# Patient Record
Sex: MALE | Race: WHITE | NOT HISPANIC OR LATINO | Employment: FULL TIME | URBAN - METROPOLITAN AREA
[De-identification: names, ages, dates, MRNs, and addresses within clinical notes are randomized per-mention and may not be internally consistent; named-entity substitution may affect disease eponyms.]

---

## 2017-06-26 ENCOUNTER — HOSPITAL ENCOUNTER (EMERGENCY)
Facility: HOSPITAL | Age: 42
Discharge: HOME | End: 2017-06-26
Attending: EMERGENCY MEDICINE
Payer: MEDICAID

## 2017-06-26 VITALS
OXYGEN SATURATION: 97 % | RESPIRATION RATE: 18 BRPM | HEIGHT: 73 IN | DIASTOLIC BLOOD PRESSURE: 79 MMHG | BODY MASS INDEX: 32.07 KG/M2 | WEIGHT: 242 LBS | SYSTOLIC BLOOD PRESSURE: 137 MMHG | TEMPERATURE: 98 F | HEART RATE: 78 BPM

## 2017-06-26 DIAGNOSIS — S61.219A FINGER LACERATION, INITIAL ENCOUNTER: Primary | ICD-10-CM

## 2017-06-26 PROCEDURE — 63600175 PHARM REV CODE 636 W HCPCS: Performed by: PHYSICIAN ASSISTANT

## 2017-06-26 PROCEDURE — 25000003 PHARM REV CODE 250: Performed by: EMERGENCY MEDICINE

## 2017-06-26 PROCEDURE — 99283 EMERGENCY DEPT VISIT LOW MDM: CPT | Mod: 25

## 2017-06-26 PROCEDURE — 12042 INTMD RPR N-HF/GENIT2.6-7.5: CPT | Mod: F7

## 2017-06-26 PROCEDURE — 25000003 PHARM REV CODE 250: Performed by: PHYSICIAN ASSISTANT

## 2017-06-26 PROCEDURE — 90471 IMMUNIZATION ADMIN: CPT | Performed by: PHYSICIAN ASSISTANT

## 2017-06-26 PROCEDURE — 90715 TDAP VACCINE 7 YRS/> IM: CPT | Performed by: PHYSICIAN ASSISTANT

## 2017-06-26 RX ORDER — HYDROCODONE BITARTRATE AND ACETAMINOPHEN 5; 325 MG/1; MG/1
1 TABLET ORAL 4 TIMES DAILY PRN
Qty: 12 TABLET | Refills: 0 | Status: SHIPPED | OUTPATIENT
Start: 2017-06-26 | End: 2018-05-13

## 2017-06-26 RX ORDER — MUPIROCIN 20 MG/G
OINTMENT TOPICAL ONCE
Status: COMPLETED | OUTPATIENT
Start: 2017-06-26 | End: 2017-06-26

## 2017-06-26 RX ORDER — DOXYCYCLINE 100 MG/1
100 CAPSULE ORAL 2 TIMES DAILY
Qty: 20 CAPSULE | Refills: 0 | Status: SHIPPED | OUTPATIENT
Start: 2017-06-26 | End: 2017-07-06

## 2017-06-26 RX ORDER — LIDOCAINE HYDROCHLORIDE 10 MG/ML
10 INJECTION INFILTRATION; PERINEURAL
Status: COMPLETED | OUTPATIENT
Start: 2017-06-26 | End: 2017-06-26

## 2017-06-26 RX ORDER — HYDROCODONE BITARTRATE AND ACETAMINOPHEN 5; 325 MG/1; MG/1
1 TABLET ORAL
Status: COMPLETED | OUTPATIENT
Start: 2017-06-26 | End: 2017-06-26

## 2017-06-26 RX ADMIN — HYDROCODONE BITARTRATE AND ACETAMINOPHEN 1 TABLET: 5; 325 TABLET ORAL at 05:06

## 2017-06-26 RX ADMIN — LIDOCAINE HYDROCHLORIDE 10 ML: 10 INJECTION, SOLUTION INFILTRATION; PERINEURAL at 04:06

## 2017-06-26 RX ADMIN — MUPIROCIN: 20 OINTMENT TOPICAL at 06:06

## 2017-06-26 RX ADMIN — CLOSTRIDIUM TETANI TOXOID ANTIGEN (FORMALDEHYDE INACTIVATED), CORYNEBACTERIUM DIPHTHERIAE TOXOID ANTIGEN (FORMALDEHYDE INACTIVATED), BORDETELLA PERTUSSIS TOXOID ANTIGEN (GLUTARALDEHYDE INACTIVATED), BORDETELLA PERTUSSIS FILAMENTOUS HEMAGGLUTININ ANTIGEN (FORMALDEHYDE INACTIVATED), BORDETELLA PERTUSSIS PERTACTIN ANTIGEN, AND BORDETELLA PERTUSSIS FIMBRIAE 2/3 ANTIGEN 0.5 ML: 5; 2; 2.5; 5; 3; 5 INJECTION, SUSPENSION INTRAMUSCULAR at 04:06

## 2017-06-26 NOTE — ED PROVIDER NOTES
Encounter Date: 6/26/2017       History     Chief Complaint   Patient presents with    Laceration     cut with metal bucket      Tommie Augustine is a 42 y.o. Male presenting for evaluation of laceration to his right third finger that occurred just prior to arrival after cutting the finger a bucket handle while crab fishing.  No numbness or tingling.  He doesn't remember when his last tetanus was given.  He has not taken any medication for his symptoms.        The history is provided by the spouse and the patient.     Review of patient's allergies indicates:  No Known Allergies  History reviewed. No pertinent past medical history.  Past Surgical History:   Procedure Laterality Date    BACK SURGERY       History reviewed. No pertinent family history.  Social History   Substance Use Topics    Smoking status: Former Smoker    Smokeless tobacco: Not on file    Alcohol use Not on file     Review of Systems   Constitutional: Negative for chills and fever.   Musculoskeletal: Positive for arthralgias, joint swelling and myalgias. Negative for back pain, neck pain and neck stiffness.   Skin: Positive for wound. Negative for color change, pallor and rash.   Neurological: Negative for weakness and numbness.   Hematological: Does not bruise/bleed easily.       Physical Exam     Initial Vitals [06/26/17 1543]   BP Pulse Resp Temp SpO2   137/79 78 18 98.2 °F (36.8 °C) 97 %      MAP       98.33         Physical Exam    Nursing note and vitals reviewed.  Constitutional: He appears well-developed and well-nourished. He is not diaphoretic. No distress.   Cardiovascular: Intact distal pulses.   Musculoskeletal: Normal range of motion. He exhibits tenderness. He exhibits no edema.        Right hand: He exhibits tenderness, laceration and swelling. He exhibits normal range of motion, no bony tenderness and no deformity. Normal sensation noted. Normal strength noted.        Hands:  5 cm stellate laceration noted to flexor aspect of  right 3rd finger.  No visible tendon injury.  No decreased ROM, decreased strength or loss of sensation to RUE.  Palpable 2+ radial pulse.  No visible foreign bodies noted.     Neurological: He is alert and oriented to person, place, and time. He has normal strength. No sensory deficit.   Skin: Skin is warm and dry. No rash and no abscess noted. No erythema.         ED Course   Lac Repair  Date/Time: 6/26/2017 6:53 PM  Performed by: MISSY SAMAYOA  Authorized by: VALE MEMBRENO   Body area: upper extremity  Location details: right long finger  Laceration length: 5 cm  Foreign bodies: no foreign bodies  Tendon involvement: none  Nerve involvement: none  Vascular damage: no  Anesthesia: digital block    Anesthesia:  Local Anesthetic: lidocaine 1% without epinephrine  Anesthetic total: 4 mL  Patient sedated: no  Preparation: Patient was prepped and draped in the usual sterile fashion.  Irrigation solution: saline  Irrigation method: syringe  Amount of cleaning: extensive  Debridement: none  Degree of undermining: none  Skin closure: 4-0 nylon (11)  Subcutaneous closure: 4-0 Vicryl (4)  Technique: simple  Approximation: loose  Approximation difficulty: complex  Dressing: antibiotic ointment, non-stick sterile dressing and splint for protection  Patient tolerance: Patient tolerated the procedure well with no immediate complications        Labs Reviewed - No data to display          Medical Decision Making:   Differential Diagnosis:   Laceration  Tendon injury  Foreign bodies         APC / Resident Notes:   Tetanus is updated today.  He tolerated the laceration repair well.  No visible foreign bodies or tendon disruption is evident today.  Low suspicion for bony abnormality.  He is given a splint and discharged home to follow-up with orthopedics.  Doxycycline prophylaxis started, given the exposure to saltwater.  He voices understanding and is agreeable to the plan.  He is given specific return precautions.   Suture removal in 7 days or per ortho.               ED Course     Clinical Impression:   The encounter diagnosis was Finger laceration, initial encounter.                           Marti Grajeda PA-C  06/26/17 0612

## 2017-07-10 ENCOUNTER — HOSPITAL ENCOUNTER (EMERGENCY)
Facility: HOSPITAL | Age: 42
Discharge: HOME | End: 2017-07-10
Attending: EMERGENCY MEDICINE
Payer: MEDICAID

## 2017-07-10 VITALS
DIASTOLIC BLOOD PRESSURE: 80 MMHG | OXYGEN SATURATION: 98 % | TEMPERATURE: 98 F | HEART RATE: 66 BPM | SYSTOLIC BLOOD PRESSURE: 115 MMHG | HEIGHT: 73 IN | WEIGHT: 220 LBS | BODY MASS INDEX: 29.16 KG/M2 | RESPIRATION RATE: 12 BRPM

## 2017-07-10 DIAGNOSIS — Z51.89 VISIT FOR WOUND CHECK: Primary | ICD-10-CM

## 2017-07-10 PROCEDURE — 99281 EMR DPT VST MAYX REQ PHY/QHP: CPT

## 2017-07-10 RX ORDER — CLINDAMYCIN HYDROCHLORIDE 300 MG/1
300 CAPSULE ORAL EVERY 6 HOURS
COMMUNITY
End: 2018-05-13

## 2017-07-10 RX ORDER — DOXYCYCLINE HYCLATE 100 MG
100 TABLET ORAL 2 TIMES DAILY
COMMUNITY
End: 2018-05-13

## 2017-07-10 NOTE — DISCHARGE INSTRUCTIONS
Finish your course of antibiotics.  Use the antibiotic cream twice a day.  Keep area clean and dry.  Call and see your primary care provider and orthopedics.  For worsening symptoms, chest pain, shortness of breath, increased abdominal pain, high grade fever, stroke or stroke like symptoms, immediately go to the nearest Emergency Room or call 911 as soon as possible.

## 2017-07-10 NOTE — ED PROVIDER NOTES
"Encounter Date: 7/10/2017       History     Chief Complaint   Patient presents with    Wound Check     R middle finger lac repair here on 6/26/17. Wound open, draining. Rocephin injection at [facility] 7/6/17 and Clindamycin Rx.     Patient is a 42 year old male who presents for wound check. He denied PMH. He was seen in our ED on 6/26/17 for laceration while crabbing. His laceration was repaired and he was discharged on doxycyline. He states 8 days after sutures being placed, he removed them himself. He reports on 7/6/17 he went to [facility] where he was given a shot of rocephin and oral clindamycin. He reports he has been taking the antibiotics as prescribed. He denied redness, warmth or drainage from the wound. He states he is concerned because the wound has "opened up and isn't completely healed". He denied fever, chills or recent illness.       The history is provided by the patient and the spouse.     Review of patient's allergies indicates:  No Known Allergies  History reviewed. No pertinent past medical history.  Past Surgical History:   Procedure Laterality Date    BACK SURGERY       History reviewed. No pertinent family history.  Social History   Substance Use Topics    Smoking status: Former Smoker    Smokeless tobacco: Never Used    Alcohol use Not on file     Review of Systems   Constitutional: Negative for chills and fever.   HENT: Negative for congestion and sore throat.    Respiratory: Negative for cough and shortness of breath.    Cardiovascular: Negative for chest pain.   Gastrointestinal: Negative for abdominal pain and nausea.   Genitourinary: Negative for dysuria.   Musculoskeletal: Negative for back pain.   Skin: Positive for wound. Negative for rash.   Neurological: Negative for weakness.   Hematological: Does not bruise/bleed easily.       Physical Exam     Initial Vitals [07/10/17 1610]   BP Pulse Resp Temp SpO2   115/80 66 12 98.2 °F (36.8 °C) 98 %      MAP     "   91.67         Physical Exam    Nursing note and vitals reviewed.  Constitutional: He appears well-developed and well-nourished.   HENT:   Head: Normocephalic and atraumatic.   Right Ear: External ear normal.   Left Ear: External ear normal.   Nose: Nose normal.   Eyes: Conjunctivae are normal. Right eye exhibits no discharge. Left eye exhibits no discharge. No scleral icterus.   Neck: Normal range of motion. Neck supple.   Cardiovascular: Normal rate, regular rhythm and normal heart sounds. Exam reveals no gallop and no friction rub.    No murmur heard.  Pulmonary/Chest: Breath sounds normal. He has no wheezes. He has no rhonchi. He has no rales.   Abdominal: Soft. Bowel sounds are normal. He exhibits no distension. There is no tenderness.   Musculoskeletal: Normal range of motion. He exhibits no tenderness.        Right hand: He exhibits normal range of motion, no tenderness, no bony tenderness and normal capillary refill. Normal sensation noted. Normal strength noted.        Hands:  Neurological: He is oriented to person, place, and time.   Skin: Skin is warm and dry.         ED Course   Procedures  Labs Reviewed - No data to display          Medical Decision Making:   History:   I obtained history from: someone other than patient.  Old Medical Records: I decided to obtain old medical records.       APC / Resident Notes:   This is an emergent evaluation of a 42-year-old male who presents for wound check.  He had sutures placed in our facility, removed them himself and has follow with urgent care.  He completed doxycycline and is currently taking clindamycin.  He denied redness, drainage or tenderness to the wound.  Sutures were initially placed by CHAPO Sapp, she was present in the ER yesterday and examine the wound with me.  There is no erythema, drainage or warmth.  He has full range of motion of the finger.  There is some wound dehiscence, this may be secondary to him removing his own stitches prior  to needing to be removed.  There is no sign of infection or cellulitic changes.  He has not followed up with anyone.  I did a bedside ultrasound which showed no identifiable abscess.  He is to complete his course of clindamycin and follow with appropriate specialists. Discussed results with patient. Return precautions given. Patient is to follow up with their primary care provider. Case was discussed with Dr. Guadalupe who is in agreement with the plan of care. All questions answered.                 ED Course     Clinical Impression:   The encounter diagnosis was Visit for wound check.                           Zari Wong PA-C  07/11/17 5939

## 2018-05-13 ENCOUNTER — HOSPITAL ENCOUNTER (OUTPATIENT)
Facility: HOSPITAL | Age: 43
Discharge: HOME | End: 2018-05-15
Attending: EMERGENCY MEDICINE | Admitting: INTERNAL MEDICINE
Payer: MEDICAID

## 2018-05-13 DIAGNOSIS — L03.012 CELLULITIS OF FINGER OF LEFT HAND: Primary | ICD-10-CM

## 2018-05-13 LAB
ALBUMIN SERPL BCP-MCNC: 4.2 G/DL
ALP SERPL-CCNC: 68 U/L
ALT SERPL W/O P-5'-P-CCNC: 26 U/L
ANION GAP SERPL CALC-SCNC: 8 MMOL/L
AST SERPL-CCNC: 16 U/L
BASOPHILS # BLD AUTO: 0.1 K/UL
BASOPHILS NFR BLD: 0.7 %
BILIRUB SERPL-MCNC: 0.3 MG/DL
BUN SERPL-MCNC: 14 MG/DL
CALCIUM SERPL-MCNC: 9.3 MG/DL
CHLORIDE SERPL-SCNC: 106 MMOL/L
CO2 SERPL-SCNC: 22 MMOL/L
CREAT SERPL-MCNC: 1.1 MG/DL
CRP SERPL-MCNC: 2.1 MG/L
DIFFERENTIAL METHOD: ABNORMAL
EOSINOPHIL # BLD AUTO: 0.2 K/UL
EOSINOPHIL NFR BLD: 2.6 %
ERYTHROCYTE [DISTWIDTH] IN BLOOD BY AUTOMATED COUNT: 13.6 %
ERYTHROCYTE [SEDIMENTATION RATE] IN BLOOD BY WESTERGREN METHOD: 2 MM/HR
EST. GFR  (AFRICAN AMERICAN): >60 ML/MIN/1.73 M^2
EST. GFR  (NON AFRICAN AMERICAN): >60 ML/MIN/1.73 M^2
GLUCOSE SERPL-MCNC: 107 MG/DL
HCT VFR BLD AUTO: 42.4 %
HGB BLD-MCNC: 14.3 G/DL
LYMPHOCYTES # BLD AUTO: 2.9 K/UL
LYMPHOCYTES NFR BLD: 35.4 %
MCH RBC QN AUTO: 28.5 PG
MCHC RBC AUTO-ENTMCNC: 33.8 G/DL
MCV RBC AUTO: 84 FL
MONOCYTES # BLD AUTO: 0.6 K/UL
MONOCYTES NFR BLD: 7.6 %
NEUTROPHILS # BLD AUTO: 4.3 K/UL
NEUTROPHILS NFR BLD: 53.7 %
PLATELET # BLD AUTO: 219 K/UL
PMV BLD AUTO: 7.7 FL
POTASSIUM SERPL-SCNC: 3.8 MMOL/L
PROT SERPL-MCNC: 7.3 G/DL
RBC # BLD AUTO: 5.03 M/UL
SODIUM SERPL-SCNC: 136 MMOL/L
WBC # BLD AUTO: 8.1 K/UL

## 2018-05-13 PROCEDURE — 96375 TX/PRO/DX INJ NEW DRUG ADDON: CPT

## 2018-05-13 PROCEDURE — 36415 COLL VENOUS BLD VENIPUNCTURE: CPT

## 2018-05-13 PROCEDURE — 25000003 PHARM REV CODE 250: Performed by: EMERGENCY MEDICINE

## 2018-05-13 PROCEDURE — 85025 COMPLETE CBC W/AUTO DIFF WBC: CPT

## 2018-05-13 PROCEDURE — 80053 COMPREHEN METABOLIC PANEL: CPT

## 2018-05-13 PROCEDURE — 86140 C-REACTIVE PROTEIN: CPT

## 2018-05-13 PROCEDURE — 85651 RBC SED RATE NONAUTOMATED: CPT

## 2018-05-13 PROCEDURE — S0077 INJECTION, CLINDAMYCIN PHOSP: HCPCS | Performed by: EMERGENCY MEDICINE

## 2018-05-13 PROCEDURE — 96367 TX/PROPH/DG ADDL SEQ IV INF: CPT

## 2018-05-13 PROCEDURE — 99285 EMERGENCY DEPT VISIT HI MDM: CPT | Mod: 25

## 2018-05-13 PROCEDURE — 63600175 PHARM REV CODE 636 W HCPCS: Performed by: EMERGENCY MEDICINE

## 2018-05-13 PROCEDURE — 96365 THER/PROPH/DIAG IV INF INIT: CPT

## 2018-05-13 RX ORDER — CLINDAMYCIN PHOSPHATE 900 MG/50ML
900 INJECTION, SOLUTION INTRAVENOUS
Status: DISCONTINUED | OUTPATIENT
Start: 2018-05-13 | End: 2018-05-14

## 2018-05-13 RX ORDER — CEFTRIAXONE 1 G/50ML
1 INJECTION, SOLUTION INTRAVENOUS
Status: COMPLETED | OUTPATIENT
Start: 2018-05-13 | End: 2018-05-13

## 2018-05-13 RX ORDER — HYDROMORPHONE HYDROCHLORIDE 2 MG/ML
1 INJECTION, SOLUTION INTRAMUSCULAR; INTRAVENOUS; SUBCUTANEOUS
Status: COMPLETED | OUTPATIENT
Start: 2018-05-13 | End: 2018-05-13

## 2018-05-13 RX ORDER — DOXYCYCLINE HYCLATE 100 MG
100 TABLET ORAL EVERY 12 HOURS
Status: DISCONTINUED | OUTPATIENT
Start: 2018-05-13 | End: 2018-05-15 | Stop reason: HOSPADM

## 2018-05-13 RX ADMIN — HYDROMORPHONE HYDROCHLORIDE 1 MG: 2 INJECTION INTRAMUSCULAR; INTRAVENOUS; SUBCUTANEOUS at 11:05

## 2018-05-13 RX ADMIN — CEFTRIAXONE 1 G: 1 INJECTION, SOLUTION INTRAVENOUS at 10:05

## 2018-05-13 RX ADMIN — DOXYCYCLINE HYCLATE 100 MG: 100 TABLET, COATED ORAL at 10:05

## 2018-05-13 RX ADMIN — CLINDAMYCIN IN 5 PERCENT DEXTROSE 900 MG: 18 INJECTION, SOLUTION INTRAVENOUS at 11:05

## 2018-05-14 PROBLEM — L03.012 CELLULITIS OF FINGER OF LEFT HAND: Status: ACTIVE | Noted: 2018-05-14

## 2018-05-14 LAB
ANION GAP SERPL CALC-SCNC: 8 MMOL/L
BASOPHILS # BLD AUTO: 0 K/UL
BASOPHILS NFR BLD: 0.3 %
BUN SERPL-MCNC: 12 MG/DL
CALCIUM SERPL-MCNC: 8.5 MG/DL
CHLORIDE SERPL-SCNC: 107 MMOL/L
CO2 SERPL-SCNC: 21 MMOL/L
CREAT SERPL-MCNC: 0.9 MG/DL
DIFFERENTIAL METHOD: ABNORMAL
EOSINOPHIL # BLD AUTO: 0.2 K/UL
EOSINOPHIL NFR BLD: 2.7 %
ERYTHROCYTE [DISTWIDTH] IN BLOOD BY AUTOMATED COUNT: 13.3 %
EST. GFR  (AFRICAN AMERICAN): >60 ML/MIN/1.73 M^2
EST. GFR  (NON AFRICAN AMERICAN): >60 ML/MIN/1.73 M^2
GLUCOSE SERPL-MCNC: 102 MG/DL
HCT VFR BLD AUTO: 38.1 %
HGB BLD-MCNC: 13.3 G/DL
LYMPHOCYTES # BLD AUTO: 2.5 K/UL
LYMPHOCYTES NFR BLD: 31.3 %
MCH RBC QN AUTO: 29.2 PG
MCHC RBC AUTO-ENTMCNC: 34.8 G/DL
MCV RBC AUTO: 84 FL
MONOCYTES # BLD AUTO: 0.5 K/UL
MONOCYTES NFR BLD: 6.4 %
NEUTROPHILS # BLD AUTO: 4.8 K/UL
NEUTROPHILS NFR BLD: 59.3 %
PLATELET # BLD AUTO: 182 K/UL
PMV BLD AUTO: 7.4 FL
POTASSIUM SERPL-SCNC: 3.9 MMOL/L
RBC # BLD AUTO: 4.54 M/UL
SODIUM SERPL-SCNC: 136 MMOL/L
VANCOMYCIN SERPL-MCNC: 19.9 UG/ML
WBC # BLD AUTO: 8 K/UL

## 2018-05-14 PROCEDURE — 63600175 PHARM REV CODE 636 W HCPCS: Performed by: INTERNAL MEDICINE

## 2018-05-14 PROCEDURE — 25000003 PHARM REV CODE 250: Performed by: EMERGENCY MEDICINE

## 2018-05-14 PROCEDURE — 99219 PR INITIAL OBSERVATION CARE,LEVL II: CPT | Mod: ,,, | Performed by: INTERNAL MEDICINE

## 2018-05-14 PROCEDURE — 85025 COMPLETE CBC W/AUTO DIFF WBC: CPT

## 2018-05-14 PROCEDURE — 94761 N-INVAS EAR/PLS OXIMETRY MLT: CPT

## 2018-05-14 PROCEDURE — 80048 BASIC METABOLIC PNL TOTAL CA: CPT

## 2018-05-14 PROCEDURE — 25000003 PHARM REV CODE 250: Performed by: INTERNAL MEDICINE

## 2018-05-14 PROCEDURE — G0378 HOSPITAL OBSERVATION PER HR: HCPCS

## 2018-05-14 PROCEDURE — 80202 ASSAY OF VANCOMYCIN: CPT

## 2018-05-14 PROCEDURE — 36415 COLL VENOUS BLD VENIPUNCTURE: CPT

## 2018-05-14 RX ORDER — ENOXAPARIN SODIUM 100 MG/ML
40 INJECTION SUBCUTANEOUS EVERY 24 HOURS
Status: DISCONTINUED | OUTPATIENT
Start: 2018-05-14 | End: 2018-05-15 | Stop reason: HOSPADM

## 2018-05-14 RX ORDER — SODIUM CHLORIDE 0.9 % (FLUSH) 0.9 %
5 SYRINGE (ML) INJECTION
Status: DISCONTINUED | OUTPATIENT
Start: 2018-05-14 | End: 2018-05-15 | Stop reason: HOSPADM

## 2018-05-14 RX ORDER — CEFTRIAXONE 1 G/50ML
1 INJECTION, SOLUTION INTRAVENOUS
Status: DISCONTINUED | OUTPATIENT
Start: 2018-05-14 | End: 2018-05-15 | Stop reason: HOSPADM

## 2018-05-14 RX ORDER — IBUPROFEN 200 MG
24 TABLET ORAL
Status: DISCONTINUED | OUTPATIENT
Start: 2018-05-14 | End: 2018-05-15 | Stop reason: HOSPADM

## 2018-05-14 RX ORDER — HYDROCODONE BITARTRATE AND ACETAMINOPHEN 10; 325 MG/1; MG/1
1 TABLET ORAL EVERY 6 HOURS PRN
Status: DISCONTINUED | OUTPATIENT
Start: 2018-05-14 | End: 2018-05-15 | Stop reason: HOSPADM

## 2018-05-14 RX ORDER — HYDROMORPHONE HYDROCHLORIDE 1 MG/ML
1 INJECTION, SOLUTION INTRAMUSCULAR; INTRAVENOUS; SUBCUTANEOUS EVERY 4 HOURS PRN
Status: DISCONTINUED | OUTPATIENT
Start: 2018-05-14 | End: 2018-05-15 | Stop reason: HOSPADM

## 2018-05-14 RX ORDER — IBUPROFEN 200 MG
16 TABLET ORAL
Status: DISCONTINUED | OUTPATIENT
Start: 2018-05-14 | End: 2018-05-15 | Stop reason: HOSPADM

## 2018-05-14 RX ORDER — ACETAMINOPHEN 325 MG/1
650 TABLET ORAL EVERY 4 HOURS PRN
Status: DISCONTINUED | OUTPATIENT
Start: 2018-05-14 | End: 2018-05-15 | Stop reason: HOSPADM

## 2018-05-14 RX ORDER — ONDANSETRON 2 MG/ML
4 INJECTION INTRAMUSCULAR; INTRAVENOUS EVERY 8 HOURS PRN
Status: DISCONTINUED | OUTPATIENT
Start: 2018-05-14 | End: 2018-05-15 | Stop reason: HOSPADM

## 2018-05-14 RX ORDER — GLUCAGON 1 MG
1 KIT INJECTION
Status: DISCONTINUED | OUTPATIENT
Start: 2018-05-14 | End: 2018-05-15 | Stop reason: HOSPADM

## 2018-05-14 RX ORDER — ACETAMINOPHEN 325 MG/1
650 TABLET ORAL EVERY 8 HOURS PRN
Status: DISCONTINUED | OUTPATIENT
Start: 2018-05-14 | End: 2018-05-15 | Stop reason: HOSPADM

## 2018-05-14 RX ORDER — CEFTRIAXONE 1 G/50ML
1 INJECTION, SOLUTION INTRAVENOUS
Status: DISCONTINUED | OUTPATIENT
Start: 2018-05-15 | End: 2018-05-14

## 2018-05-14 RX ORDER — PANTOPRAZOLE SODIUM 40 MG/1
40 TABLET, DELAYED RELEASE ORAL DAILY
Status: DISCONTINUED | OUTPATIENT
Start: 2018-05-14 | End: 2018-05-15 | Stop reason: HOSPADM

## 2018-05-14 RX ADMIN — Medication 1 MG: at 07:05

## 2018-05-14 RX ADMIN — CEFTRIAXONE 1 G: 1 INJECTION, SOLUTION INTRAVENOUS at 09:05

## 2018-05-14 RX ADMIN — DOXYCYCLINE HYCLATE 100 MG: 100 TABLET, COATED ORAL at 08:05

## 2018-05-14 RX ADMIN — PANTOPRAZOLE SODIUM 40 MG: 40 TABLET, DELAYED RELEASE ORAL at 09:05

## 2018-05-14 RX ADMIN — Medication 1 MG: at 09:05

## 2018-05-14 RX ADMIN — SODIUM CHLORIDE 1750 MG: 9 INJECTION, SOLUTION INTRAVENOUS at 02:05

## 2018-05-14 RX ADMIN — DOXYCYCLINE HYCLATE 100 MG: 100 TABLET, COATED ORAL at 09:05

## 2018-05-14 RX ADMIN — HYDROCODONE BITARTRATE AND ACETAMINOPHEN 1 TABLET: 10; 325 TABLET ORAL at 08:05

## 2018-05-14 RX ADMIN — Medication 1 MG: at 01:05

## 2018-05-14 RX ADMIN — ENOXAPARIN SODIUM 40 MG: 100 INJECTION SUBCUTANEOUS at 05:05

## 2018-05-14 RX ADMIN — Medication 1 MG: at 05:05

## 2018-05-14 NOTE — ED NOTES
Patient identifiers for Tommie Augustine checked and correct.  LOC:  Patient is awake, alert, and aware of environment with an appropriate affect. Patient is oriented x 3 and speaking appropriately.  APPEARANCE:  Patient resting comfortably and in no acute distress. Patient is clean and well groomed, patient's clothing is properly fastened.  SKIN:  The skin is warm and dry. Patient has normal skin turgor and moist mucus membranes. Swelling and pain noted to left thumb from crab stick.   MUSCULOSKELETAL:  Patient is moving all extremities well, no obvious deformities noted. Pulses intact.   RESPIRATORY:  Airway is open and patent. Respirations are spontaneous and non-labored with normal effort and rate.  CARDIAC:  Patient has a normal rate and rhythm. No peripheral edema noted. Capillary refill < 3 seconds.  ABDOMEN:  No distention noted.  Soft and non-tender upon palpation.  NEUROLOGICAL:  PERRL. Facial expression is symmetrical. Hand grasps are equal bilaterally. Normal sensation in all extremities when touched with finger.  Allergies reported:  Review of patient's allergies indicates:  No Known Allergies  OTHER NOTES:

## 2018-05-14 NOTE — CONSULTS
"[facility]  Orthopedics  Consult Note    Patient Name: Tommie Augustine  MRN: [id number]  Admission Date: 5/13/2018  Hospital Length of Stay: 0 days  Attending Provider: Cooper Moreno MD  Primary Care Provider: Rober Sepulveda MD    Patient information was obtained from patient and ER records.     Consults  Subjective:     Principal Problem:Cellulitis of finger of left hand    Chief Complaint:   Chief Complaint   Patient presents with    Hand Injury     Lt thumb was bitten by a crab.  Was told by Avel that he had a fx to site.  Possible open fx due to break in nailbed         HPI: Pt presents with LEFT thumb pain.  Reports getting "bit by a crab".  When the pain increased he presented to the ED.  No fever/chills.      Past Medical History:   Diagnosis Date    Seizures     as a child       Past Surgical History:   Procedure Laterality Date    BACK SURGERY  2003    replaced disk in lumbar spine       Review of patient's allergies indicates:  No Known Allergies    Current Facility-Administered Medications   Medication    acetaminophen tablet 650 mg    acetaminophen tablet 650 mg    cefTRIAXone 1 gram/50 mL IVPB 1 g    dextrose 50% injection 12.5 g    dextrose 50% injection 25 g    doxycycline tablet 100 mg    enoxaparin injection 40 mg    glucagon (human recombinant) injection 1 mg    glucose chewable tablet 16 g    glucose chewable tablet 24 g    hydrocodone-acetaminophen 10-325mg per tablet 1 tablet    HYDROmorphone injection 1 mg    ondansetron injection 4 mg    pantoprazole EC tablet 40 mg    sodium chloride 0.9% flush 5 mL    vancomycin (VANCOCIN) 1,750 mg in sodium chloride 0.9% 500 mL IVPB     Family History     None        Social History Main Topics    Smoking status: Former Smoker    Smokeless tobacco: Never Used    Alcohol use Not on file    Drug use: Unknown    Sexual activity: Not on file     ROS  Objective:     Vital Signs (Most Recent):  Temp: 98.3 °F (36.8 °C) " "(05/14/18 1205)  Pulse: 71 (05/14/18 1205)  Resp: 16 (05/14/18 1205)  BP: 107/63 (05/14/18 1205)  SpO2: 98 % (05/14/18 1205) Vital Signs (24h Range):  Temp:  [97.5 °F (36.4 °C)-98.3 °F (36.8 °C)] 98.3 °F (36.8 °C)  Pulse:  [53-71] 71  Resp:  [16-18] 16  SpO2:  [96 %-99 %] 98 %  BP: (107-154)/(63-91) 107/63     Weight: 114.8 kg (253 lb)  Height: 6' 1" (185.4 cm)  Body mass index is 33.38 kg/m².    No intake or output data in the 24 hours ending 05/14/18 1453    Ortho/SPM Exam    Awake/alert/oriented x3, No acute distress, Afebrile, Vital signs stable  Normocephalic, Atraumatic  Heart is beating at normal rate  Good inspiratory effort with unlaboured breathing  Abdomen soft/nondistended/nontender    Left upper extremity  Motor intact C5-T1  Sensation intact C5-T2  2+ brachial/radial/ulnar pulses  <2s CR refill to digits  There is mild swelling about the distal thumb  Decreased ROM secondary to pain  No fluctuence/erythema    Significant Labs: All pertinent labs within the past 24 hours have been reviewed.    Significant Imaging: I have reviewed all pertinent imaging results/findings.    Assessment/Plan:     Active Diagnoses:    Diagnosis Date Noted POA    PRINCIPAL PROBLEM:  Cellulitis of finger of left hand [L03.012] 05/14/2018 Yes      Problems Resolved During this Admission:    Diagnosis Date Noted Date Resolved POA     43yo M with LEFT thumb pain    Continue Abx regimen per primary  No surgical indication at this time  Thanx for the consult    Michele Romero MD  [facility]      "

## 2018-05-14 NOTE — CONSULTS
Tommie Augustine [id number] is a 42 y.o. male who has been consulted for vancomycin dosing.    The patient has the following labs:     Date Creatinine (mg/dl)    BUN WBC Count   5/14/2018 Estimated Creatinine Clearance: 115 mL/min (based on SCr of 1.1 mg/dL). Lab Results   Component Value Date    BUN 14 05/13/2018     Lab Results   Component Value Date    WBC 8.10 05/13/2018        Current weight is 114.8 kg (253 lb)      The patient will be started on vancomycin at a dose of 1750 mg every 12 hours.  The vancomycin trough has been ordered for 5/15 at 1430.  Target trough goal is 10 to 15.    Patient will be followed by pharmacy for changes in renal function, toxicity, and efficacy.   Thank you for allowing us to participate in this patient's care.     Joon Cook

## 2018-05-14 NOTE — H&P
"PCP: Rober Sepulveda MD    History & Physical    Chief Complaint: Left thumb pain and swelling    History of Present Illness:  Patient is a 42 y.o. male admitted to Hospitalist Service from [facility] with complaint of left thumb pain and swelling for 1 day. Patient reportedly has past medical history significant for Seizure disorder during childhood. Patient is a former smoker and denied any personal history of DM. Patient is a . Patient presented with left thumb pain s/p grab bite. The pain was initially in the distal left thumb radiating to the thenar  eminence and ulnar aspect of wrist. Pt was bit by a "enzo crab" yesterday. He was seen at Urgent Care and was told her had a fx to the site. He denies fever and reports diaphoresis. Pt present to the ED to r/o cellulitis. He has taken Ibuprofen for the pain. Patient denied chest pain, shortness of breath, abdominal pain, nausea, vomiting, headache, vision changes, focal neuro-deficits, cough or fever.    Past Medical History:   Diagnosis Date    Seizures     as a child     Past Surgical History:   Procedure Laterality Date    BACK SURGERY  2003    replaced disk in lumbar spine     History reviewed. No pertinent family history.  Social History   Substance Use Topics    Smoking status: Former Smoker    Smokeless tobacco: Never Used    Alcohol use Not on file      Review of patient's allergies indicates:  No Known Allergies  No prescriptions prior to admission.     Review of Systems:  Constitutional: no fever or chills  Eyes: no visual changes  Ears, nose, mouth, throat, and face: no nasal congestion or sore throat  Respiratory: no cough or shorness of breath  Cardiovascular: no chest pain or palpitations  Gastrointestinal: no nausea or vomiting, no abdominal pain or change in bowel habits  Genitourinary: no hematuria or dysuria  Integument/breast: see HPI  Hematologic/lymphatic: no easy bruising or " lymphadenopathy  Musculoskeletal: see HPI  Neurological: no seizures or tremors.  Behavioral/Psych: no auditory or visual hallucinations  Endocrine: no heat or cold intolerance     OBJECTIVE:     Vital Signs (Most Recent)  Temp: 97.8 °F (36.6 °C) (05/14/18 0734)  Pulse: 65 (05/14/18 0740)  Resp: 18 (05/14/18 0740)  BP: 117/75 (05/14/18 0734)  SpO2: 98 % (05/14/18 0740)    Physical Exam:  General appearance: well developed, appears stated age  Head: normocephalic, atraumatic  Eyes:  conjunctivae/corneas clear. PERRL.  Nose: Nares normal. Septum midline.  Throat: lips, mucosa, and tongue normal; teeth and gums normal, no throat erythema.  Neck: supple, symmetrical, trachea midline, no JVD and thyroid not enlarged, symmetric, no tenderness/mass/nodules  Lungs:  clear to auscultation bilaterally and normal respiratory effort  Chest wall: no tenderness  Heart: regular rate and rhythm, S1, S2 normal, no murmur, click, rub or gallop  Abdomen: soft, non-tender non-distented; bowel sounds normal; no masses,  no organomegaly  Extremities: no cyanosis, clubbing or edema. Small healing puncture wound over ulnar aspect of the left thumb. Proximal to nail bed: small amount of erythema approximately 1x1 cm. Diffuse tenderness of the thumb distal to IP joint with associated swelling and bruising under nail bed. No bulla formation. No crepitus. FROM at base of thumb. Limited ROM at IP joint secondary to pain.    Pulses: 2+ and symmetric  Skin: Skin color, texture, turgor normal. No rashes or lesions.  Lymph nodes: Cervical, supraclavicular, and axillary nodes normal.  Neurologic: Normal strength and tone. No focal numbness or weakness. CNII-XII intact.      Laboratory:   CBC:   Recent Labs  Lab 05/14/18  0511   WBC 8.00   RBC 4.54*   HGB 13.3*   HCT 38.1*      MCV 84   MCH 29.2   MCHC 34.8     CMP:   Recent Labs  Lab 05/13/18  2216 05/14/18  0511    102   CALCIUM 9.3 8.5*   ALBUMIN 4.2  --    PROT 7.3  --     136    K 3.8 3.9   CO2 22* 21*    107   BUN 14 12   CREATININE 1.1 0.9   ALKPHOS 68  --    ALT 26  --    AST 16  --    BILITOT 0.3  --      ESR: 2    Microbiology Results (last 7 days)     ** No results found for the last 168 hours. **        Diagnostic Results:  Left hand X-Ray:   Negative x-rays of the left hand    Assessment/Plan:     Active Hospital Problems    Diagnosis  POA    Cellulitis of finger of left hand [L03.012]  Left thumb punctured wound  Keep extremity elevated.   Continue IV Ceftriaxone, Doxycycline and Vancomycin. Vancomycin doing per pharmacy.  Will consult orthopedics for opinion.    Yes   Discussed with patient's wife.      VTE Risk Mitigation         Ordered     enoxaparin injection 40 mg  Daily      05/14/18 1444     IP VTE HIGH RISK PATIENT  Once      05/14/18 0134     Place sequential compression device  Until discontinued      05/14/18 0134     Place ISAAC hose  Until discontinued      05/14/18 0134        Cooper Moreno MD  Department of Hospital Medicine   [facility]

## 2018-05-14 NOTE — ED PROVIDER NOTES
"Encounter Date: 5/13/2018    SCRIBE #1 NOTE: I, Tasha Da Silva, am scribing for, and in the presence of, Dr. Tee .       History     Chief Complaint   Patient presents with    Hand Injury     Lt thumb was bitten by a crab.  Was told by Avel that he had a fx to site.  Possible open fx due to break in nailbed        05/13/2018 10:01 PM     Chief complaint: Thumb injury       Tommie Augustine is a 42 y.o. male with no pertinent PMHx who presents to the ED with c/o left thumb pain s/p grab bite. The pain was initially in the distal left thumb radiating to the thenar  eminence and ulnar aspect of wrist. Pt was bit by a "enzo crab" yesterday. He was seen at Urgent Care and was told her had a fx to the site. He denies fever and reports diaphoresis. Pt present to the ED to r/o cellulitis. He has taken Ibuprofen for the pain. Pt denies tobacco use and alcohol use. NKDA noted.       The history is provided by the patient.     Review of patient's allergies indicates:  No Known Allergies  Past Medical History:   Diagnosis Date    Seizures     as a child     Past Surgical History:   Procedure Laterality Date    BACK SURGERY  2003    replaced disk in lumbar spine     History reviewed. No pertinent family history.  Social History   Substance Use Topics    Smoking status: Former Smoker    Smokeless tobacco: Never Used    Alcohol use Not on file     Review of Systems   Constitutional: Negative for fever.   HENT: Negative for sore throat.    Eyes: Negative for redness.   Respiratory: Negative for shortness of breath.    Cardiovascular: Negative for chest pain.   Gastrointestinal: Negative for nausea.   Genitourinary: Negative for dysuria.   Musculoskeletal: Positive for arthralgias (Lt thumb pain ). Negative for back pain.   Skin: Negative for rash.   Neurological: Negative for weakness.   Hematological: Does not bruise/bleed easily.       Physical Exam     Initial Vitals [05/13/18 2149]   BP Pulse Resp Temp SpO2   (!) 154/90 " 60 16 97.6 °F (36.4 °C) 98 %      MAP       111.33         Physical Exam    Nursing note and vitals reviewed.  Constitutional: He appears well-developed and well-nourished.  Non-toxic appearance. No distress.   HENT:   Head: Normocephalic and atraumatic.   Eyes: EOM are normal. Pupils are equal, round, and reactive to light.   Neck: Normal range of motion. Neck supple. No neck rigidity. No JVD present.   Cardiovascular: Normal rate, regular rhythm, normal heart sounds and intact distal pulses. Exam reveals no gallop and no friction rub.    No murmur heard.  Pulmonary/Chest: Breath sounds normal. He has no wheezes. He has no rhonchi. He has no rales.   Abdominal: Soft. Bowel sounds are normal. He exhibits no distension. There is no tenderness. There is no rigidity, no rebound and no guarding.   Musculoskeletal: Normal range of motion.   Small healing puncture wound over ulnar aspect of the left thumb. Proximal to nail bed: small amount of erythema approximately 1x1 cm. Diffuse tenderness of the thumb distal to IP joint with associated swelling and bruising under nail bed. No bulla formation. No crepitus. FROM at base of thumb. Limited ROM at IP joint secondary to pain.    Neurological: He is alert and oriented to person, place, and time. He has normal strength and normal reflexes. No cranial nerve deficit or sensory deficit. He exhibits normal muscle tone. Coordination normal. GCS eye subscore is 4. GCS verbal subscore is 5. GCS motor subscore is 6.   Skin: Skin is warm and dry.   Psychiatric: He has a normal mood and affect. His speech is normal and behavior is normal. He is not actively hallucinating.         ED Course   Procedures  Labs Reviewed   CBC W/ AUTO DIFFERENTIAL - Abnormal; Notable for the following:        Result Value    MPV 7.7 (*)     All other components within normal limits   COMPREHENSIVE METABOLIC PANEL - Abnormal; Notable for the following:     CO2 22 (*)     All other components within normal  limits   SEDIMENTATION RATE, MANUAL   C-REACTIVE PROTEIN             Medical Decision Making:   History:   Old Medical Records: I decided to obtain old medical records.  Initial Assessment:   Patient is a 42-year-old right-hand-dominant man who works as a  and presents emergency department for severe left thumb pain after being pinched by crab 1 day ago.  Went to urgent care and told he had a fractured thumb with cellulitis and needed to come to the ER for IV antibiotics.  X-ray repeated.  I do not appreciate a fractured phalanx.  X-[facility] sent to [facility] who sees no fracture.  Thumb is edema this with mild erythema in the area of a small puncture where it appears patient punctured his thumb with a needle to try to drain pus but did not get any discharge. His pain however is concerning because is out of proportion to physical exam findings.  He has no tenderness along his flexor tendons.  He has limited range of motion of his IP joint.  Have concern for cellulitis of the left thumb with water exposure.  I do not see any subcutaneous gas on x-ray.  Given his pain that is out of proportion and salt water exposure I am worried about possible vibrio infection.  Patient has no immunocompromised status.  He does not drink alcohol or have any liver disease.  I believe it would be prudent to be conservative in his treatment and admit him for IV antibiotics.  He is started on doxycycline, clindamycin and Rocephin.  Discussed with the hospitalist who agrees to admit the patient.  May consider consulting Ortho.  Clinical Tests:   Lab Tests: Ordered and Reviewed  Radiological Study: Ordered and Reviewed    I, Randal King, personally performed the services described in this documentation. All medical record entries made by the scribe were at my direction and in my presence.  I have reviewed the chart and agree that the record reflects my personal performance and is accurate and complete.  Graeme Tee MD.  5:46 AM 05/14/2018                      Clinical Impression:     1. Cellulitis of finger of left hand       Disposition:   Disposition: Placed in Observation                        Graeme Tee MD  05/14/18 0546

## 2018-05-14 NOTE — PLAN OF CARE
42 year old with cellulitis of left thumb with water exposure.  No fracture on xray.Labs normal.    Plan:  1. Given Rocephin, Doxy, Clinda to cover gas forming organsims, vibrio, Staph, and strep.  2. Admit   3. Continue Rocephin, Doxy and will give dose of Vanc for MRSA coverage although clinda likely covered initially  4. Consideration for ortho consult in the morning  5. Norco 10 for pain.     Please call with any questions. Care to be turned over to dr melara at 6 am.

## 2018-05-14 NOTE — PLAN OF CARE
PCP is Dr Sepulveda.  Verified insurance as [employer] medicaid.  Pharmacy is [facility] on [facility].  Denies HH/DME.  Discharge plan is home; no needs.       05/14/18 1521   Discharge Assessment   Assessment Type Discharge Planning Assessment   Confirmed/corrected address and phone number on facesheet? Yes   Assessment information obtained from? Patient   Prior to hospitilization cognitive status: Alert/Oriented   Prior to hospitalization functional status: Independent   Current cognitive status: Alert/Oriented   Current Functional Status: Independent   Lives With spouse   Able to Return to Prior Arrangements yes   Is patient able to care for self after discharge? Yes   Patient's perception of discharge disposition home or selfcare   Readmission Within The Last 30 Days no previous admission in last 30 days   Patient currently being followed by outpatient case management? No   Patient currently receives any other outside agency services? No   Equipment Currently Used at Home none   Do you have any problems affording any of your prescribed medications? No   Is the patient taking medications as prescribed? yes   Does the patient have transportation home? Yes   Transportation Available family or friend will provide   Does the patient receive services at the [facility]? No   Discharge Plan A Home   Patient/Family In Agreement With Plan yes

## 2018-05-14 NOTE — ED NOTES
Attempted to call report to 2nd floor. Unable to take report at present. Will call back when able.

## 2018-05-15 VITALS
BODY MASS INDEX: 33.53 KG/M2 | DIASTOLIC BLOOD PRESSURE: 73 MMHG | RESPIRATION RATE: 20 BRPM | HEART RATE: 66 BPM | SYSTOLIC BLOOD PRESSURE: 120 MMHG | HEIGHT: 73 IN | TEMPERATURE: 98 F | WEIGHT: 253 LBS | OXYGEN SATURATION: 95 %

## 2018-05-15 LAB
ANION GAP SERPL CALC-SCNC: 5 MMOL/L
BASOPHILS # BLD AUTO: 0 K/UL
BASOPHILS NFR BLD: 0.4 %
BUN SERPL-MCNC: 9 MG/DL
CALCIUM SERPL-MCNC: 8.7 MG/DL
CHLORIDE SERPL-SCNC: 108 MMOL/L
CO2 SERPL-SCNC: 24 MMOL/L
CREAT SERPL-MCNC: 0.8 MG/DL
DIFFERENTIAL METHOD: ABNORMAL
EOSINOPHIL # BLD AUTO: 0.1 K/UL
EOSINOPHIL NFR BLD: 1.7 %
ERYTHROCYTE [DISTWIDTH] IN BLOOD BY AUTOMATED COUNT: 13.6 %
EST. GFR  (AFRICAN AMERICAN): >60 ML/MIN/1.73 M^2
EST. GFR  (NON AFRICAN AMERICAN): >60 ML/MIN/1.73 M^2
GLUCOSE SERPL-MCNC: 102 MG/DL
HCT VFR BLD AUTO: 40.3 %
HGB BLD-MCNC: 13.9 G/DL
LYMPHOCYTES # BLD AUTO: 1.8 K/UL
LYMPHOCYTES NFR BLD: 24.6 %
MCH RBC QN AUTO: 29.3 PG
MCHC RBC AUTO-ENTMCNC: 34.6 G/DL
MCV RBC AUTO: 85 FL
MONOCYTES # BLD AUTO: 0.4 K/UL
MONOCYTES NFR BLD: 5.7 %
NEUTROPHILS # BLD AUTO: 5 K/UL
NEUTROPHILS NFR BLD: 67.6 %
PLATELET # BLD AUTO: 185 K/UL
PMV BLD AUTO: 7.6 FL
POTASSIUM SERPL-SCNC: 4 MMOL/L
RBC # BLD AUTO: 4.76 M/UL
SODIUM SERPL-SCNC: 137 MMOL/L
WBC # BLD AUTO: 7.5 K/UL

## 2018-05-15 PROCEDURE — 96374 THER/PROPH/DIAG INJ IV PUSH: CPT

## 2018-05-15 PROCEDURE — 96368 THER/DIAG CONCURRENT INF: CPT

## 2018-05-15 PROCEDURE — 80048 BASIC METABOLIC PNL TOTAL CA: CPT

## 2018-05-15 PROCEDURE — 94761 N-INVAS EAR/PLS OXIMETRY MLT: CPT

## 2018-05-15 PROCEDURE — 25000003 PHARM REV CODE 250: Performed by: INTERNAL MEDICINE

## 2018-05-15 PROCEDURE — 25000003 PHARM REV CODE 250: Performed by: EMERGENCY MEDICINE

## 2018-05-15 PROCEDURE — 36415 COLL VENOUS BLD VENIPUNCTURE: CPT

## 2018-05-15 PROCEDURE — 85025 COMPLETE CBC W/AUTO DIFF WBC: CPT

## 2018-05-15 PROCEDURE — 63600175 PHARM REV CODE 636 W HCPCS: Performed by: INTERNAL MEDICINE

## 2018-05-15 PROCEDURE — 99225 PR SUBSEQUENT OBSERVATION CARE,LEVEL II: CPT | Mod: ,,, | Performed by: INTERNAL MEDICINE

## 2018-05-15 PROCEDURE — G0378 HOSPITAL OBSERVATION PER HR: HCPCS

## 2018-05-15 RX ORDER — HYDROCODONE BITARTRATE AND ACETAMINOPHEN 5; 325 MG/1; MG/1
1 TABLET ORAL EVERY 6 HOURS PRN
Qty: 15 TABLET | Refills: 0 | Status: SHIPPED | OUTPATIENT
Start: 2018-05-15 | End: 2020-05-31 | Stop reason: CLARIF

## 2018-05-15 RX ORDER — LEVOFLOXACIN 500 MG/1
500 TABLET, FILM COATED ORAL DAILY
Qty: 10 TABLET | Refills: 0 | Status: SHIPPED | OUTPATIENT
Start: 2018-05-15 | End: 2018-05-25

## 2018-05-15 RX ORDER — CLINDAMYCIN HYDROCHLORIDE 300 MG/1
300 CAPSULE ORAL EVERY 8 HOURS
Qty: 30 CAPSULE | Refills: 0 | Status: SHIPPED | OUTPATIENT
Start: 2018-05-15 | End: 2020-05-31 | Stop reason: CLARIF

## 2018-05-15 RX ADMIN — HYDROCODONE BITARTRATE AND ACETAMINOPHEN 1 TABLET: 10; 325 TABLET ORAL at 10:05

## 2018-05-15 RX ADMIN — PANTOPRAZOLE SODIUM 40 MG: 40 TABLET, DELAYED RELEASE ORAL at 08:05

## 2018-05-15 RX ADMIN — DOXYCYCLINE HYCLATE 100 MG: 100 TABLET, COATED ORAL at 08:05

## 2018-05-15 RX ADMIN — HYDROCODONE BITARTRATE AND ACETAMINOPHEN 1 TABLET: 10; 325 TABLET ORAL at 02:05

## 2018-05-15 RX ADMIN — SODIUM CHLORIDE 1750 MG: 9 INJECTION, SOLUTION INTRAVENOUS at 02:05

## 2018-05-15 NOTE — DISCHARGE SUMMARY
"Discharge Summary  Hospital Medicine    Admit Date: 5/13/2018    Date and Time: 5/15/133859:16 AM    Discharge Attending Physician: Cooper Moreno MD    Primary Care Physician: Rober Sepulveda MD    Diagnoses:  Active Hospital Problems    Diagnosis  POA    *Cellulitis of finger of left hand [L03.012]  Left thumb punctured wound  Yes        Discharged Condition: Good    Hospital Course:   Patient is a 42 y.o. male admitted to Hospitalist Service from [facility] Emergency Room with complaint of left thumb pain and swelling for 1 day. Patient reportedly has past medical history significant for Seizure disorder during childhood. Patient is a former smoker and denied any personal history of DM. Patient is a . Patient presented with left thumb pain s/p grab bite. The pain was initially in the distal left thumb radiating to the thenar  eminence and ulnar aspect of wrist. Pt was bit by a "enzo crab" yesterday. He was seen at Urgent Care and was told her had a fx to the site. He denies fever and reports diaphoresis. Pt present to the ED to r/o cellulitis. He has taken Ibuprofen for the pain. Patient denied chest pain, shortness of breath, abdominal pain, nausea, vomiting, headache, vision changes, focal neuro-deficits, cough or fever. Patient was admitted to Hospitalist medicine service. Patient was evaluated by Dr. Romero. No fluctuation or abscess noted.  Patient was started on IV antibiotics. Patient encouraged to elevate extremity. Routine wound care continued. Patient's symptoms improved and patient transitioned to PO antibiotics. Patient encouraged to see Dr. Sepulveda this week to ensure continuing improvement. Patient was discharged home in stable condition with following discharge plan of care.     Consults: Dr. Romero    Significant Diagnostic Studies:   Left hand X-Ray: Negative x-rays of the left hand    Microbiology Results (last 7 days)     ** No results found for the last 168 " hours. **        Special Treatments/Procedures: as above  Disposition: Home or Self Care    Medications:  Reconciled Home Medications: Current Discharge Medication List      START taking these medications    Details   levoFLOXacin (LEVAQUIN) 500 MG tablet Take 1 tablet (500 mg total) by mouth once daily.  Qty: 10 tablet, Refills: 0         CONTINUE these medications which have CHANGED    Details   clindamycin (CLEOCIN) 300 MG capsule Take 1 capsule (300 mg total) by mouth every 8 (eight) hours.  Qty: 30 capsule, Refills: 0      Hydrocodone-APAP (NORCO) 5-325 mg per tablet Take 1 tablet by mouth every 6 (six) hours as needed for Pain.  Qty: 15 tablet, Refills: 0         STOP taking these medications       doxycycline (VIBRA-TABS) 100 MG tablet Comments:   Reason for Stopping:               Discharge Procedure Orders  Diet Adult Regular     Activity as tolerated   Order Comments: Observe fall precautions.   Scheduling Instructions: Keep left hand elevated     Call MD for:   Order Comments: For worsening symptoms, chest pain, shortness of breath, increased abdominal pain, high grade fever, stroke or stroke like symptoms, immediately go to the nearest Emergency Room or call 911 as soon as possible.       Follow-up Information     Rober Sepulveda MD In 1 week.    Specialty:  Internal Medicine  Contact information:  [address] [place] [address] [facility] [address] [phone]             Michele Romero MD.    Specialty:  Orthopedic Surgery  Why:  As needed  Contact information:  Marjan LORD  [facility] [id number] [phone]

## 2018-05-15 NOTE — PLAN OF CARE
Problem: Patient Care Overview  Goal: Plan of Care Review  Outcome: Ongoing (interventions implemented as appropriate)  Pt on room air with 96% sats

## 2018-05-16 NOTE — PLAN OF CARE
05/16/18 0722   Final Note   Assessment Type Final Discharge Note   Discharge Disposition Home

## 2018-10-28 NOTE — PLAN OF CARE
Problem: Patient Care Overview  Goal: Plan of Care Review  Outcome: Ongoing (interventions implemented as appropriate)   Resting quietly during hourly rounding for patient safety. Pain controlled with PRN pain medication. Antibiotic therapy in progress. L thumb red and warm to touch. Oriented X4. IV R anti cubital intact flushes easily. Wife at bedside.        EMS

## 2020-05-03 ENCOUNTER — HOSPITAL ENCOUNTER (EMERGENCY)
Facility: HOSPITAL | Age: 45
Discharge: HOME | End: 2020-05-03
Attending: EMERGENCY MEDICINE
Payer: MEDICAID

## 2020-05-03 VITALS
OXYGEN SATURATION: 98 % | RESPIRATION RATE: 18 BRPM | DIASTOLIC BLOOD PRESSURE: 76 MMHG | HEIGHT: 73 IN | HEART RATE: 63 BPM | BODY MASS INDEX: 31.81 KG/M2 | WEIGHT: 240 LBS | SYSTOLIC BLOOD PRESSURE: 124 MMHG | TEMPERATURE: 98 F

## 2020-05-03 DIAGNOSIS — S80.12XA CONTUSION OF LEFT LEG, INITIAL ENCOUNTER: ICD-10-CM

## 2020-05-03 PROCEDURE — 99283 EMERGENCY DEPT VISIT LOW MDM: CPT | Mod: 25

## 2020-05-03 NOTE — DISCHARGE INSTRUCTIONS
As we discussed, return to the emergency department for new worsening symptoms including inability to walk, numbness, weakness, pus drainage from wound.
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
## 2020-05-31 ENCOUNTER — HOSPITAL ENCOUNTER (EMERGENCY)
Facility: HOSPITAL | Age: 45
Discharge: HOME | End: 2020-05-31
Attending: EMERGENCY MEDICINE
Payer: MEDICAID

## 2020-05-31 VITALS
TEMPERATURE: 98 F | HEIGHT: 73 IN | DIASTOLIC BLOOD PRESSURE: 89 MMHG | BODY MASS INDEX: 31.81 KG/M2 | SYSTOLIC BLOOD PRESSURE: 136 MMHG | RESPIRATION RATE: 18 BRPM | HEART RATE: 64 BPM | WEIGHT: 240 LBS | OXYGEN SATURATION: 100 %

## 2020-05-31 DIAGNOSIS — L08.9 FINGER INFECTION: Primary | ICD-10-CM

## 2020-05-31 PROCEDURE — 25000003 PHARM REV CODE 250: Performed by: EMERGENCY MEDICINE

## 2020-05-31 PROCEDURE — 99284 EMERGENCY DEPT VISIT MOD MDM: CPT

## 2020-05-31 RX ORDER — NAPROXEN 250 MG/1
500 TABLET ORAL
Status: COMPLETED | OUTPATIENT
Start: 2020-05-31 | End: 2020-05-31

## 2020-05-31 RX ORDER — DOXYCYCLINE 100 MG/1
100 CAPSULE ORAL 2 TIMES DAILY
Qty: 14 CAPSULE | Refills: 0 | Status: SHIPPED | OUTPATIENT
Start: 2020-05-31 | End: 2020-06-07

## 2020-05-31 RX ORDER — DOXYCYCLINE HYCLATE 100 MG
100 TABLET ORAL
Status: COMPLETED | OUTPATIENT
Start: 2020-05-31 | End: 2020-05-31

## 2020-05-31 RX ORDER — NAPROXEN 500 MG/1
500 TABLET ORAL 2 TIMES DAILY WITH MEALS
Qty: 30 TABLET | Refills: 0 | Status: SHIPPED | OUTPATIENT
Start: 2020-05-31 | End: 2020-06-15

## 2020-05-31 RX ADMIN — NAPROXEN 500 MG: 250 TABLET ORAL at 01:05

## 2020-05-31 RX ADMIN — DOXYCYCLINE HYCLATE 100 MG: 100 TABLET, FILM COATED ORAL at 01:05

## 2020-05-31 NOTE — ED PROVIDER NOTES
Encounter Date: 5/31/2020    SCRIBE #1 NOTE: Catarina BLANCO, am scribing for, and in the presence of, Dr. Rodriguez.       History     Chief Complaint   Patient presents with    Crab pinched finger     2 days ago and having increasing pain       Time seen by provider: 1:25 AM on 05/31/2020    Tommie Augustine is a 45 y.o. male who presents to the ED with c/o a finger injury. Patient states a crab pinched the left index finger 2 days ago and he has had increased pain with mild swelling  since. Patient took Ibuprofen earlier today with some relief. He is UTD on immunizations. He has been treated with doxycyline and clindamycin for previous skin infections with success. He has no other injuries. No pertinent PSHx.     The history is provided by the patient.     Review of patient's allergies indicates:  No Known Allergies  Past Medical History:   Diagnosis Date    Seizures     as a child     Past Surgical History:   Procedure Laterality Date    BACK SURGERY  2003    replaced disk in lumbar spine     No family history on file.  Social History     Tobacco Use    Smoking status: Former Smoker    Smokeless tobacco: Never Used   Substance Use Topics    Alcohol use: Never     Frequency: Never    Drug use: Not on file     Review of Systems   Constitutional: Negative for fever.   HENT: Negative for congestion.    Eyes: Negative for visual disturbance.   Respiratory: Negative for wheezing.    Cardiovascular: Negative for chest pain.   Gastrointestinal: Negative for abdominal pain.   Genitourinary: Negative for dysuria.   Musculoskeletal: Positive for arthralgias, joint swelling and myalgias.   Skin: Positive for wound. Negative for rash.   Neurological: Negative for syncope.   Hematological: Does not bruise/bleed easily.   Psychiatric/Behavioral: Negative for confusion.       Physical Exam     Initial Vitals [05/31/20 0108]   BP Pulse Resp Temp SpO2   136/89 64 18 98.1 °F (36.7 °C) 100 %      MAP       --         Physical  Exam    Nursing note and vitals reviewed.  Constitutional: He appears well-nourished.   HENT:   Head: Normocephalic and atraumatic.   Eyes: Conjunctivae and EOM are normal.   Neck: Normal range of motion. Neck supple. No thyroid mass present.   Cardiovascular: Normal rate, regular rhythm and normal heart sounds. Exam reveals no gallop and no friction rub.    No murmur heard.  Pulmonary/Chest: Breath sounds normal. He has no wheezes. He has no rhonchi. He has no rales.   Abdominal: Soft. Normal appearance and bowel sounds are normal. There is no tenderness.   Neurological: He is alert and oriented to person, place, and time. He has normal strength. No cranial nerve deficit or sensory deficit.   Skin: Skin is warm and dry. No rash noted. No erythema.   Mild tenderness and swelling localized to the tip of the left index finger.  No fusiform swelling.  Finger is not held in passive flexion.  No redness tracking along the tendon sheath   Psychiatric: He has a normal mood and affect. His speech is normal. Cognition and memory are normal.         ED Course   Procedures  Labs Reviewed - No data to display       Imaging Results    None          Medical Decision Making:   History:   Old Medical Records: I decided to obtain old medical records.  ED Management:  Patient was interviewed and examined emergently.  Initial vital signs are stable.  The patient appears to have tenderness mild swelling localized to the nail cuticle without overt drainable paronychia.  There are no additional findings concerning for flexor tenosynovitis or felon.  Patient will be initiated on p.o. doxycycline and is educated about further supportive care for salt water associated early cellulitis.  He is asked to follow up with a primary care doctor as soon as possible regarding expected improvement or to return to the ER immediately for any new, concerning, or worsening symptoms including worsening pain, swelling, fever despite ongoing doxycycline  therapy.  Patient is agreeable with this plan for follow-up and was discharged stable condition.            Scribe Attestation:   Scribe #1: I performed the above scribed service and the documentation accurately describes the services I performed. I attest to the accuracy of the note.                   I, Dr. Dominick Rodriguez, personally performed the services described in this documentation. All medical record entries made by the scribe were at my direction and in my presence.  I have reviewed the chart and agree that the record reflects my personal performance and is accurate and complete. Dominick Rodriguez MD.  6:12 AM 05/31/2020          Clinical Impression:       ICD-10-CM ICD-9-CM   1. Finger infection L08.9 686.9         Disposition:   Disposition: Discharged  Condition: Stable     ED Disposition Condition    Discharge Stable        ED Prescriptions     Medication Sig Dispense Start Date End Date Auth. Provider    doxycycline (VIBRAMYCIN) 100 MG Cap Take 1 capsule (100 mg total) by mouth 2 (two) times daily. for 7 days 14 capsule 5/31/2020 6/7/2020 Dominick Rodriguez MD    naproxen (NAPROSYN) 500 MG tablet Take 1 tablet (500 mg total) by mouth 2 (two) times daily with meals. for 15 days 30 tablet 5/31/2020 6/15/2020 Dominick Rodriguez MD        Follow-up Information     Follow up With Specialties Details Why Contact Info    [facility] Emergency Medicine Schedule an appointment as soon as possible for a visit   [address] [place] [id number] [phone]                                     Dominick Rodriguez MD  05/31/20 0612

## 2020-09-30 ENCOUNTER — HOSPITAL ENCOUNTER (EMERGENCY)
Facility: HOSPITAL | Age: 45
Discharge: HOME | End: 2020-09-30
Attending: EMERGENCY MEDICINE
Payer: MEDICAID

## 2020-09-30 VITALS
BODY MASS INDEX: 31.99 KG/M2 | DIASTOLIC BLOOD PRESSURE: 76 MMHG | WEIGHT: 242.5 LBS | SYSTOLIC BLOOD PRESSURE: 111 MMHG | OXYGEN SATURATION: 94 % | TEMPERATURE: 98 F | RESPIRATION RATE: 20 BRPM | HEART RATE: 55 BPM

## 2020-09-30 DIAGNOSIS — M51.36 DEGENERATIVE LUMBAR DISC: Primary | ICD-10-CM

## 2020-09-30 PROCEDURE — 99284 EMERGENCY DEPT VISIT MOD MDM: CPT | Mod: 25

## 2020-09-30 PROCEDURE — 25000003 PHARM REV CODE 250: Performed by: EMERGENCY MEDICINE

## 2020-09-30 PROCEDURE — 96372 THER/PROPH/DIAG INJ SC/IM: CPT

## 2020-09-30 PROCEDURE — 63600175 PHARM REV CODE 636 W HCPCS: Performed by: EMERGENCY MEDICINE

## 2020-09-30 RX ORDER — CYCLOBENZAPRINE HCL 10 MG
10 TABLET ORAL
Status: COMPLETED | OUTPATIENT
Start: 2020-09-30 | End: 2020-09-30

## 2020-09-30 RX ORDER — MORPHINE SULFATE 2 MG/ML
0.1 INJECTION, SOLUTION INTRAMUSCULAR; INTRAVENOUS
Status: DISCONTINUED | OUTPATIENT
Start: 2020-09-30 | End: 2020-09-30 | Stop reason: SDUPTHER

## 2020-09-30 RX ORDER — MORPHINE SULFATE 8 MG/ML
6 INJECTION INTRAMUSCULAR; INTRAVENOUS; SUBCUTANEOUS
Status: COMPLETED | OUTPATIENT
Start: 2020-09-30 | End: 2020-09-30

## 2020-09-30 RX ORDER — MORPHINE SULFATE 10 MG/ML
5 INJECTION INTRAMUSCULAR; INTRAVENOUS; SUBCUTANEOUS
Status: COMPLETED | OUTPATIENT
Start: 2020-09-30 | End: 2020-09-30

## 2020-09-30 RX ORDER — METHYLPREDNISOLONE ACETATE 80 MG/ML
80 INJECTION, SUSPENSION INTRA-ARTICULAR; INTRALESIONAL; INTRAMUSCULAR; SOFT TISSUE
Status: COMPLETED | OUTPATIENT
Start: 2020-09-30 | End: 2020-09-30

## 2020-09-30 RX ORDER — HYDROCODONE BITARTRATE AND ACETAMINOPHEN 5; 325 MG/1; MG/1
1 TABLET ORAL EVERY 6 HOURS PRN
Qty: 12 TABLET | Refills: 0 | Status: SHIPPED | OUTPATIENT
Start: 2020-09-30 | End: 2020-10-10

## 2020-09-30 RX ORDER — CYCLOBENZAPRINE HCL 10 MG
10 TABLET ORAL 3 TIMES DAILY PRN
Qty: 15 TABLET | Refills: 0 | Status: SHIPPED | OUTPATIENT
Start: 2020-09-30 | End: 2020-10-05

## 2020-09-30 RX ADMIN — CYCLOBENZAPRINE 10 MG: 10 TABLET, FILM COATED ORAL at 02:09

## 2020-09-30 RX ADMIN — MORPHINE SULFATE 5 MG: 10 INJECTION INTRAVENOUS at 02:09

## 2020-09-30 RX ADMIN — METHYLPREDNISOLONE ACETATE 80 MG: 80 INJECTION, SUSPENSION INTRA-ARTICULAR; INTRALESIONAL; INTRAMUSCULAR; SOFT TISSUE at 02:09

## 2020-09-30 RX ADMIN — MORPHINE SULFATE 6 MG: 8 INJECTION, SOLUTION INTRAMUSCULAR; INTRAVENOUS at 02:09

## 2020-09-30 NOTE — ED NOTES
At point of D/C, patient had specific questions about results of xray in light of pre-existing hardware. Requesting conference with MD prior to D/C.

## 2020-09-30 NOTE — ED NOTES
S/P re-eval & discussion of tx options @ this time, now departs for home with wife. D/C instructions & Rx given @ this time.

## 2020-09-30 NOTE — ED PROVIDER NOTES
Encounter Date: 9/30/2020       History     Chief Complaint   Patient presents with    Back Pain     felt ''pop'' in back while taking a shower, x 1 week.     Chief complaint:  Back pain    HPI:  45-year-old male with a history of back pain presents with a 6 day history of severe lumbar pain.  Reports when he turned he felt a pop with severe pain.  He denies any weakness, numbness and has no fecal urinary incontinence.  He has had prior back surgery.  He has no fever night sweats or chills.        Review of patient's allergies indicates:  No Known Allergies  Past Medical History:   Diagnosis Date    Seizures     as a child     Past Surgical History:   Procedure Laterality Date    BACK SURGERY  2003    replaced disk in lumbar spine     No family history on file.  Social History     Tobacco Use    Smoking status: Former Smoker    Smokeless tobacco: Never Used   Substance Use Topics    Alcohol use: Never     Frequency: Never    Drug use: Not on file     Review of Systems   Constitutional: Negative for activity change, appetite change, chills, fatigue and fever.   Eyes: Negative for visual disturbance.   Respiratory: Negative for apnea and shortness of breath.    Cardiovascular: Negative for chest pain and palpitations.   Gastrointestinal: Negative for abdominal distention and abdominal pain.   Genitourinary: Negative for difficulty urinating.   Musculoskeletal: Positive for back pain. Negative for neck pain.   Skin: Negative for pallor and rash.   Neurological: Negative for weakness, numbness and headaches.   Hematological: Does not bruise/bleed easily.   Psychiatric/Behavioral: Negative for agitation.       Physical Exam     Initial Vitals [09/30/20 1358]   BP Pulse Resp Temp SpO2   (!) 144/89 73 18 98.1 °F (36.7 °C) 98 %      MAP       --         Physical Exam    Nursing note and vitals reviewed.  Constitutional: He appears well-developed and well-nourished.   HENT:   Head: Normocephalic and atraumatic.    Eyes: Conjunctivae are normal.   Neck: Normal range of motion. Neck supple.   Cardiovascular: Normal rate, regular rhythm and normal heart sounds. Exam reveals no gallop and no friction rub.    No murmur heard.  Pulmonary/Chest: Breath sounds normal. No respiratory distress. He has no wheezes. He has no rhonchi. He has no rales.   Abdominal: Soft. He exhibits no distension. There is no abdominal tenderness.   Musculoskeletal: Normal range of motion. No tenderness.   Neurological: He is alert and oriented to person, place, and time. He has normal strength. No sensory deficit. GCS score is 15. GCS eye subscore is 4. GCS verbal subscore is 5. GCS motor subscore is 6.   Skin: Skin is warm and dry.   Psychiatric: He has a normal mood and affect.         ED Course   Procedures  Labs Reviewed - No data to display       Imaging Results          X-Ray Lumbar Spine Ap And Lateral (Final result)  Result time 09/30/20 14:31:49    Final result by Feliciano Alamo MD (09/30/20 14:31:49)                 Impression:      As above      Electronically signed by: Feliciano Alamo MD  Date:    09/30/2020  Time:    14:31             Narrative:    EXAMINATION:  XR LUMBAR SPINE AP AND LATERAL    CLINICAL HISTORY:  Back pain or radiculopathy, < 6 wks, uncomplicated;    TECHNIQUE:  AP, lateral and spot images were performed of the lumbar spine.    COMPARISON:  None    FINDINGS:  The patient is somewhat tilted.  There has been interbody fusion at the L2-3 level.  There is associated endplate sclerosis.  The vertebral bodies maintain normal height and alignment.  There is mild disc space narrowing at the lower 2 lumbar levels.  The facet joints maintain normal articulation.  There is mild facet joint arthropathy at the L4-5 and L5-S1 levels.                                 Medical Decision Making:   ED Management:  45-year-old male with a history of previous back problems presents with abrupt onset of back pain.  X-rays independently  interpreted by me demonstrate diffuse degenerative disc disease.  He has no focal neurologic deficits.  He is given Depo-Medrol in the emergency department and given a prescription for Flexeril and hydrocodone 5 mg.                             Clinical Impression:     ICD-10-CM ICD-9-CM   1. Degenerative lumbar disc  M51.36 722.52                          ED Disposition Condition    Discharge Stable        ED Prescriptions     Medication Sig Dispense Start Date End Date Auth. Provider    cyclobenzaprine (FLEXERIL) 10 MG tablet Take 1 tablet (10 mg total) by mouth 3 (three) times daily as needed for Muscle spasms. 15 tablet 9/30/2020 10/5/2020 Nazario Guadalupe III, MD    HYDROcodone-acetaminophen (NORCO) 5-325 mg per tablet Take 1 tablet by mouth every 6 (six) hours as needed. 12 tablet 9/30/2020 10/10/2020 Nazario Guadalupe III, MD        Follow-up Information     Follow up With Specialties Details Why Contact Info    Otto Marsh MD Sports Medicine, Orthopedic Surgery In 1 week  [address] [place] [address]  [address]  [facility] [address]  [phone]                                         Nazario Guadalupe III, MD  09/30/20 6755

## 2020-10-01 ENCOUNTER — TELEPHONE (OUTPATIENT)
Dept: ORTHOPEDICS | Facility: CLINIC | Age: 45
End: 2020-10-01

## 2020-10-01 NOTE — TELEPHONE ENCOUNTER
----- Message from Carmen De Leon MA sent at 10/1/2020  2:01 PM CDT -----  Regarding: appt  Contact: patient wife Mary Beth  Patient need appt for hospital follow up , patient does have medicaid     Please call to advice [phone] (home)

## 2020-10-01 NOTE — TELEPHONE ENCOUNTER
Provided Medicaid Escalation Line for patient to find treatment for her husbands back pain.    [phone]

## 2020-11-13 ENCOUNTER — HOSPITAL ENCOUNTER (EMERGENCY)
Facility: HOSPITAL | Age: 45
Discharge: HOME | End: 2020-11-13
Attending: EMERGENCY MEDICINE
Payer: MEDICAID

## 2020-11-13 VITALS
HEIGHT: 73 IN | HEART RATE: 55 BPM | DIASTOLIC BLOOD PRESSURE: 82 MMHG | OXYGEN SATURATION: 98 % | SYSTOLIC BLOOD PRESSURE: 139 MMHG | RESPIRATION RATE: 18 BRPM | WEIGHT: 240 LBS | TEMPERATURE: 98 F | BODY MASS INDEX: 31.81 KG/M2

## 2020-11-13 DIAGNOSIS — M79.602 PAIN IN LEFT ARM: ICD-10-CM

## 2020-11-13 DIAGNOSIS — M54.12 CERVICAL RADICULOPATHY: Primary | ICD-10-CM

## 2020-11-13 DIAGNOSIS — M79.603 ARM PAIN: ICD-10-CM

## 2020-11-13 PROCEDURE — 93010 ELECTROCARDIOGRAM REPORT: CPT | Mod: ,,, | Performed by: INTERNAL MEDICINE

## 2020-11-13 PROCEDURE — 63600175 PHARM REV CODE 636 W HCPCS: Performed by: NURSE PRACTITIONER

## 2020-11-13 PROCEDURE — 99284 EMERGENCY DEPT VISIT MOD MDM: CPT | Mod: 25

## 2020-11-13 PROCEDURE — 93005 ELECTROCARDIOGRAM TRACING: CPT

## 2020-11-13 PROCEDURE — 96372 THER/PROPH/DIAG INJ SC/IM: CPT

## 2020-11-13 PROCEDURE — 93010 EKG 12-LEAD: ICD-10-PCS | Mod: ,,, | Performed by: INTERNAL MEDICINE

## 2020-11-13 RX ORDER — ORPHENADRINE CITRATE 30 MG/ML
30 INJECTION INTRAMUSCULAR; INTRAVENOUS
Status: COMPLETED | OUTPATIENT
Start: 2020-11-13 | End: 2020-11-13

## 2020-11-13 RX ORDER — NAPROXEN 500 MG/1
500 TABLET ORAL 2 TIMES DAILY WITH MEALS
Qty: 60 TABLET | Refills: 0 | Status: SHIPPED | OUTPATIENT
Start: 2020-11-13 | End: 2020-12-07 | Stop reason: SDUPTHER

## 2020-11-13 RX ORDER — KETOROLAC TROMETHAMINE 30 MG/ML
15 INJECTION, SOLUTION INTRAMUSCULAR; INTRAVENOUS
Status: COMPLETED | OUTPATIENT
Start: 2020-11-13 | End: 2020-11-13

## 2020-11-13 RX ORDER — CYCLOBENZAPRINE HCL 10 MG
10 TABLET ORAL 3 TIMES DAILY PRN
Qty: 15 TABLET | Refills: 0 | Status: SHIPPED | OUTPATIENT
Start: 2020-11-13 | End: 2020-11-18

## 2020-11-13 RX ADMIN — METHYLPREDNISOLONE SODIUM SUCCINATE 80 MG: 40 INJECTION, POWDER, FOR SOLUTION INTRAMUSCULAR; INTRAVENOUS at 03:11

## 2020-11-13 RX ADMIN — ORPHENADRINE CITRATE 30 MG: 30 INJECTION INTRAMUSCULAR; INTRAVENOUS at 02:11

## 2020-11-13 RX ADMIN — KETOROLAC TROMETHAMINE 15 MG: 30 INJECTION, SOLUTION INTRAMUSCULAR at 02:11

## 2020-11-13 NOTE — ED PROVIDER NOTES
Encounter Date: 11/13/2020    SCRIBE #1 NOTE: I, Tianna Park, am scribing for, and in the presence of, BYRON Mckeon.       History     Chief Complaint   Patient presents with    Arm Pain     pt c/o L arm pain radiating to neck and shoulder x 4days, denies injury or trauma, pain worse when lays flat, denies sob     Time seen by provider: 1:36 PM on 11/13/2020    Tommie Augustine is a 45 y.o. male with a PMHx of seizures who presents to the ED with an onset of arm pain. The patient complains of constant left arm pain radiating to his neck and shoulder onset 1 week ago. He reports pain is worse with laying down flat. He experiences constant tingling and weakness in the left arm. He admits waking up with the pain. The patient denies injury or trauma. He admits similar symptoms a year ago, which resolved itself within a few days. He is taking Ibuprofen without relief. The patient denies SOB, chest pain or any other symptoms at this time. PSHx of back surgery.    The history is provided by the patient.     Review of patient's allergies indicates:  No Known Allergies  Past Medical History:   Diagnosis Date    Seizures     as a child     Past Surgical History:   Procedure Laterality Date    BACK SURGERY  2003    replaced disk in lumbar spine     No family history on file.  Social History     Tobacco Use    Smoking status: Former Smoker    Smokeless tobacco: Never Used   Substance Use Topics    Alcohol use: Never     Frequency: Never    Drug use: Not on file     Review of Systems   Constitutional: Negative for fever.   HENT: Negative for sore throat.    Respiratory: Negative for shortness of breath.    Cardiovascular: Negative for chest pain.   Gastrointestinal: Negative for nausea.   Genitourinary: Negative for dysuria.   Musculoskeletal: Positive for arthralgias and neck pain. Negative for back pain.   Skin: Negative for rash.   Neurological: Positive for numbness. Negative for weakness.   Hematological: Does  not bruise/bleed easily.       Physical Exam     Initial Vitals [11/13/20 1145]   BP Pulse Resp Temp SpO2   (!) 141/89 65 16 98.4 °F (36.9 °C) 99 %      MAP       --         Physical Exam    Nursing note and vitals reviewed.  Constitutional: Vital signs are normal. He appears well-developed and well-nourished.   HENT:   Head: Normocephalic and atraumatic.   Eyes: Pupils are equal, round, and reactive to light.   Neck: Neck supple.   No midline cervical spine tenderness; normal ROM of the neck. He has left sided paraspinal cervical spine tenderness with tenderness over her trapezius muscle.    Cardiovascular: Normal rate, regular rhythm, normal heart sounds and intact distal pulses. Exam reveals no gallop and no friction rub.    No murmur heard.  Pulses:       Radial pulses are 2+ on the right side and 2+ on the left side.   Pulmonary/Chest: Breath sounds normal. He has no wheezes. He has no rhonchi. He has no rales.   Abdominal: Soft. Normal appearance and bowel sounds are normal. There is no abdominal tenderness.   Musculoskeletal:      Left shoulder: Normal. He exhibits normal range of motion and no tenderness.   Neurological: He is alert and oriented to person, place, and time. He has normal strength. No sensory deficit.    5/5 strength with intact sensation to BUE's and BLE's.   Skin: Skin is warm, dry and intact.   Psychiatric: He has a normal mood and affect. His speech is normal and behavior is normal.         ED Course   Procedures  Labs Reviewed - No data to display  EKG Readings: (Independently Interpreted)   Initial Reading: No STEMI. Rhythm: Normal Sinus Rhythm. Heart Rate: 60 bpm. ST Segments: Normal ST Segments. T Waves: Normal. Axis: Normal.       Imaging Results    None          Medical Decision Making:   History:   Old Medical Records: I decided to obtain old medical records.  Differential Diagnosis:   Discitis  Cervical strain  Cervical radiculopathy  Independently Interpreted Test(s):   I have  ordered and independently interpreted EKG Reading(s) - see prior notes  Clinical Tests:   Medical Tests: Ordered and Reviewed       APC / Resident Notes:   Patient is a 45 y.o. male who presents to the ED 11/14/2020 who underwent emergent evaluation for left upper extremity pain.  EKG without acute ST or T-wave abnormalities.  I do not think atypical ACS.  He denies any chest pain.  His pain is reproducible.  He has no midline cervical spine tenderness and denies any injury or trauma.  He has 5/5 strength and normal sensation bilateral upper and lower extremities.  No signs of cord compromise and I do not think any further emergent imaging is indicated at this time.  I do not think any acute arterial occlusive disease.  Patient has +2 distal pulses.  He is given anti-inflammatories and muscle relaxers as well as steroids per his request in the emergency department and is sent home on anti-inflammatories and muscle relaxers and given outpatient referral to Orthopedics. Based on my clinical evaluation, I do not appreciate any immediate, emergent, or life threatening condition or etiology that warrants additional workup today and feel that the patient can be discharged with close follow up care. Case discussed with Dr. Hartley who also evaluated patient and who is agreeable to plan of care. Follow up and return precautions discussed; patient verbalized understanding and is agreeable to plan of care. Patient discharged home in stable condition.               Scribe Attestation:   Scribe #1: I performed the above scribed service and the documentation accurately describes the services I performed. I attest to the accuracy of the note.    Attending Attestation:     Physician Attestation Statement for NP/PA:   I have conducted a face to face encounter with this patient in addition to the NP/PA, due to NP/PA Request    Other NP/PA Attestation Additions:      Medical Decision Making: Tommie Augustine is a 45 y.o. male  presenting with left back and arm pain. EKG was reviewed with no sign of acute ischemia or infarction.  I doubt ACS.  I do not think further troponin enzyme assay or provocative testing is indicated.  Pain is radicular in nature.  IM steroids given here along with analgesia.  I will refer to further outpatient management.  The patient has a nonfocal neurological examination with no red flags.  I doubt acute spinal cord processes requiring further spinal imaging such as CT or MRI.  I doubt epidural abscesses, severe deep space infection, transverse myelitis, discitis, cauda equina syndrome, or other emergent conditions.  He has excellent distal pulses and cap refill with a normal neurological examination distally.  2+ radial pulses bilaterally with 5/5 distal strength sensation tested individually and radial, median, and ulnar nerve distributions.  There is no sign of distal neurovascular compromise.  I doubt acute arterial occlusive disease.  Detailed return precautions reviewed.       Physician Attestation for Scribe:  Physician Attestation Statement for Scribe #1: I, Nely Osorio, reviewed documentation, as scribed by in my presence, and it is both accurate and complete.     Comments: I, BYRON Mckeon, personally performed the services described in this documentation. All medical record entries made by the scribe were at my direction and in my presence.  I have reviewed the chart and agree that the record reflects my personal performance and is accurate and complete. BYRON Mckeon.  8:58 AM 11/14/2020                ED Course as of Nov 13 1503   Fri Nov 13, 2020   1440 EKG:  Normal sinus rhythm at a rate of 60.  Normal intervals.  Normal axis.  No significant ST or T wave changes suggesting acute ischemia or infarction.      [MR]      ED Course User Index  [MR] Fer Hartley MD            Clinical Impression:       ICD-10-CM ICD-9-CM   1. Cervical radiculopathy  M54.12 723.4   2. Arm pain   M79.603 729.5   3. Pain in left arm  M79.602 729.5                      Disposition:   Disposition: Discharged  Condition: Stable     ED Disposition Condition    Discharge Stable        ED Prescriptions     Medication Sig Dispense Start Date End Date Auth. Provider    naproxen (NAPROSYN) 500 MG tablet Take 1 tablet (500 mg total) by mouth 2 (two) times daily with meals. 60 tablet 11/13/2020  Nely Osorio NP    cyclobenzaprine (FLEXERIL) 10 MG tablet Take 1 tablet (10 mg total) by mouth 3 (three) times daily as needed. 15 tablet 11/13/2020 11/18/2020 Nely Osorio NP        Follow-up Information     Follow up With Specialties Details Why Contact [facility] Surgical Oncology, Orthopedic Surgery, Genetics, Physical Medicine and Rehabilitation, Occupational Therapy, Radiology In 1 week  [address] [facility] [address]  [phone]      [facility] Emergency Medicine  As needed, If symptoms worsen [address] [place] [id number] [phone]                                       Nely Osorio NP  11/14/20 0858

## 2020-11-17 ENCOUNTER — PATIENT OUTREACH (OUTPATIENT)
Dept: EMERGENCY MEDICINE | Facility: HOSPITAL | Age: 45
End: 2020-11-17

## 2020-11-27 DIAGNOSIS — M54.12 CERVICAL RADICULOPATHY: Primary | ICD-10-CM

## 2020-12-03 ENCOUNTER — CLINICAL SUPPORT (OUTPATIENT)
Dept: REHABILITATION | Facility: HOSPITAL | Age: 45
End: 2020-12-03
Payer: MEDICAID

## 2020-12-03 ENCOUNTER — TELEPHONE (OUTPATIENT)
Dept: PAIN MEDICINE | Facility: CLINIC | Age: 45
End: 2020-12-03

## 2020-12-03 DIAGNOSIS — M54.2 TENDERNESS OF NECK: ICD-10-CM

## 2020-12-03 DIAGNOSIS — R29.3 POOR POSTURE: ICD-10-CM

## 2020-12-03 DIAGNOSIS — M79.601 RIGHT ARM PAIN: ICD-10-CM

## 2020-12-03 DIAGNOSIS — R29.898 DECREASED STRENGTH OF UPPER EXTREMITY: ICD-10-CM

## 2020-12-03 DIAGNOSIS — R29.898 DECREASED GRIP STRENGTH OF LEFT HAND: ICD-10-CM

## 2020-12-03 DIAGNOSIS — M54.2 CERVICAL PAIN (NECK): ICD-10-CM

## 2020-12-03 DIAGNOSIS — M53.82 DECREASED RANGE OF MOTION OF INTERVERTEBRAL DISCS OF CERVICAL SPINE: ICD-10-CM

## 2020-12-03 PROCEDURE — 97161 PT EVAL LOW COMPLEX 20 MIN: CPT

## 2020-12-03 NOTE — PLAN OF CARE
"[facility]  Physical Therapy Initial Evaluation    Name: Tommie Augustine  Clinic Number: [id number]    Therapy Diagnosis:   Encounter Diagnoses   Name Primary?    Decreased range of motion of intervertebral discs of cervical spine     Decreased  strength of left hand     Poor posture     Tenderness of neck     Cervical pain (neck)     Decreased strength of upper extremity     Right arm pain      Physician: Yvette Juan MD    Physician Orders: PT Eval and Treat  Medical Diagnosis from Referral: cervical radiculopathy   Evaluation Date: 12/3/2020  Authorization Period Expiration: 12/31/2020  Plan of Care Expiration: 1/29/2021  Visit # / Visits authorized: 1/ 1 (0/14 per POC)    Time In: 900  Time Out: 945  Total Appointment Time (timed & untimed codes): 45 minutes    Precautions: Standard    Subjective   Date of onset: started a couple of years ago but it wasn't as bad but this time it is not going away  History of current condition - Tommie reports: severe pain in the L arm and the fingers are going numb especially the L index finger. When he sleeps on the sides, he has pain in the neck and the pain goes through the arm. Pt reports he sleeps on his back and needs to keep head turned to the R. It feels like my bones and muscles are hurting. Sometimes it is just a pain and then sometimes it is a bolting through the arm. The type of pain just varies now, it used to be just a lightening bolt. "If I touch my arm, my arm just hurts hurts" Pt reports pain at night. Sleeping on his back is uncomfortable- slept two hours last night his friend gave his sleeping pill which allowed him to get one extra hour of sleep.      Medical History:   Past Medical History:   Diagnosis Date    Seizures     as a child       Surgical History:   Tommie Augustine  has a past surgical history that includes Back surgery (2003).    Medications:   Tommie has a current medication " list which includes the following prescription(s): naproxen.    Allergies:   Review of patient's allergies indicates:  No Known Allergies     Imaging,  X ray : 11/25/2020    FINDINGS:  Alignment: Straightening of normal cervical lordosis. Minimal anterolisthesis of C4 on C5. No change in alignment on dynamic views.  Vertebral Bodies: Heights preserved. Anterior osteophytosis at C4-C5. Multilevel facet arthropathy and uncovertebral hypertrophy.  Intervertebral Discs: Multilevel intervertebral disc space narrowing  Soft Tissues: Normal  Other: None.    Prior Therapy: back pain, many years ago   Social History: one story lives with their spouse  Occupation: trying to get back to work, he was a  (when scheduling, pt told  he has to check work schedule. So not sure if he is working or not?)  Prior Level of Function: independent  Current Level of Function: getting dressed and showering has to use other arm, not sure if he is able to do the work he was doing before    Pain: varies on location   Current 6/10, worst 10/10, best 6/10   Location: left neck and arm   Description: Numb, Sharp, Electric and Shooting  Aggravating Factors: laying on his sides, turning over his L shoulder  Easing Factors: tylenol- doesnt help, half of pain pill this morning from friend- he was throwing up he is in so much pain, cant touch the arm with heat or cold becuase it makes it worse    Pts goals: get back to work     Objective     Structural/Postural Inspection: head turned and sets more to the R during history and evaluation, upright posture, flat affect, seemed frustrated with current condition      Palpation for Condition: scapular dyskinesia during B shoulder elevation, L > R    Active Range of Motion (AROM)/Passive Range of Motion (PROM): with ROM testing, once pain was reported pt came out of position.     Cervical AROM (Degrees) PROM (Degrees) Comments   Flexion 45 WFL pain   Extension 30  pain   Right  Side Bend 35     Left Side Bend 20  pain   Right Rotation 45 About 60 deg     Left Rotation About 30 deg  About 50 deg  Pain      Positive mallory moffett and mauricio test indicating possible impingement    Joint Accessory:     Cervical Mob w/ Endfeel Comments   PA glide normal No pain    Right Side-glide normal Pain    Left Side-glide Normal  Pain       unable to assess thoracic spine mobility 2/2 pt reporting he is unable to lay on his belly that if he got on his belly his pain might increase and then it wont subside     Manual Muscle Testing:     Cervical Strength Grade Comments   Flexion 4/5    Extension 4/5    Right Side Bend 4/5    Left Side Bend 4-/5    Right Rotation 4/5    Left Rotation 4-/5      * pain reported in L shoulder and neck region with shoulder MMT testing     RUE Strength Grade LUE Strength Grade   Shoulder Flexion 5/5 Shoulder Flexion 4/5   Shoulder Extension 5/5 Shoulder Extension 4/5   Shoulder Abduction 5/5 Shoulder Abduction 4/5   Shoulder Adduction NT Shoulder Adduction NT   Shoulder Internal Rotation 5/5 Shoulder Internal Rotation 4/5   Shoulder External Rotation 4+/5 Shoulder External Rotation 4/5   Elbow Flexion 5/5 Elbow Flexion 4+/5   Elbow Extension 5/5 Elbow Extension 4/5      Strength: Right Hand Average: 125     Left Hand Average: 100                Special Tests:    Cervical Results Comments   Distraction Negative.    Compression Positive.    Brachial Compression Test NT.    Spurling's Positive.    Cervical Flexion Rotation Test  Negative.    DNF Endurance Test Positive.    CCFT NT.      Movement Analysis:   Guarded    Palpation for Tenderness: positive for tenderness at B suboccipitals, levator scap, upper traps    Sensation: NT test at next tx session    ULLT: pain with median nerve positions, however, non radicular and only felt in the forearm and wrist area. So negative for testing. He did not feel it from the neck.       NDI: 38/50= 76% disability     TREATMENT     Home  Exercises and Patient Education Provided    Education provided:   - HEP   - PT goals and POC     Written Home Exercises Provided: yes.  Exercises were reviewed and Tommie was able to demonstrate them prior to the end of the session.  Tommie demonstrated good  understanding of the education provided.     See EMR under Patient Instructions for exercises provided 12/3/2020.    Assessment   Tommie is a 45 y.o. male referred to outpatient Physical Therapy with a medical diagnosis of cervical radiculopathy. Pt presents with c/o cervical and LUE pain that started many months ago. Months ago the pain would come and go and it was not as severe as it is now and reports it is now constant and more severe. Pt presents with signs and symptoms consistent with neck pain with radiating pain into LUE. Pt demonestrates decreased LUE strength, decreased cervical AROM, decreased cervical strength, B scapular dyskinesia, decreased L  strength, possible impingement of L shoulder. These deficits have lead to disability score of 76% based on NDI. Pt will benefit from skilled PT services to address the above deficits which will lead to improved functional mobility, quality of life, and assist with return to work.     Pt prognosis is Fair.   Pt will benefit from skilled outpatient Physical Therapy to address the deficits stated above and in the chart below, provide pt/family education, and to maximize pt's level of independence.     Plan of care discussed with patient: Yes  Pt's spiritual, cultural and educational needs considered and patient is agreeable to the plan of care and goals as stated below:     Anticipated Barriers for therapy: history of neck pain     Medical Necessity is demonstrated by the following  History  Co-morbidities and personal factors that may impact the plan of care Co-morbidities:   difficulty sleeping    Personal Factors:   no deficits     low   Examination  Body Structures and Functions, activity limitations and  participation restrictions that may impact the plan of care Body Regions:   neck  upper extremities    Body Systems:    gross symmetry  ROM  strength  gross coordinated movement    Participation Restrictions:   Unable to work at this time, difficulty with dressing with LUE     Activity limitations:   Learning and applying knowledge  no deficits    General Tasks and Commands  no deficits    Communication  no deficits    Mobility  lifting and carrying objects    Self care  dressing    Domestic Life  no deficits    Interactions/Relationships  no deficits    Life Areas  no deficits    Community and Social Life  community life  recreation and leisure         moderate   Clinical Presentation unstable clinical presentation with unpredictable characteristics high   Decision Making/ Complexity Score: low     Goals:    STG  Weeks/Visits Date Established  Goal Status    1.Pt will report </= 7/10 pain for at worst pain over the past week to improve quality of life  4 weeks  12/3/2020      2. Pt will report improve ability to sleep in which he is able to sleep >/= 4+ hours to improve functional mobility 4 weeks  12/3/2020      3.Pt will report </= 60% disability on NDI to improve ability to carry out ADLs 4 weeks 12/3/2020      4.Pt will increase cervical AROM L SB to >/= 30 deg and L rotation to >/= 60 deg to improve ability to look over shoulder when driving 4 weeks 12/3/2020      5.Pt will verbalize and demonstrate independence with HEP to improve therapy outcomes  4 weeks   12/3/2020        LTG Weeks/Visits Date Established  Goal Status    1.Pt will report </= 4/10 pain for at worst pain over the past week to improve quality of life  8 weeks  12/3/2020      2. Pt will report improve ability to sleep in which he is able to sleep >/= 6+ hours to improve functional mobility 8 weeks  12/3/2020        3.Pt will report </= 40% disability on NDI to improve ability to carry out ADLs 8 weeks  12/3/2020      4.Pt will increase  cervical AROM L rotation to >/= 70 deg and extension  to improve ability to look over shoulder when driving 8 weeks  12/3/2020      5.Pt will increase LUE strength to >/= 4+/5 to improve ability to lift heavy objects overhead.  8 weeks  12/3/2020      6. Pt will perform cervical AROM in all directions without reports of pain to improve ability to look in overhead cabinet 8 weeks  12/3/2020          Plan   Plan of care Certification: 12/3/2020 to 1/29/2021.    Outpatient Physical Therapy 2 times weekly for 6 weeks followed by 1 times weekly for 2 weeksto include the following interventions: Electrical Stimulation TENs, Manual Therapy, Moist Heat/ Ice, Neuromuscular Re-ed, Patient Education, Therapeutic Activites, Therapeutic Exercise and Ultrasound. Pt may be seen by PTA as part of the rehabilitation team.    Cady Hough, PT

## 2020-12-03 NOTE — TELEPHONE ENCOUNTER
----- Message from Carmen De Leon MA sent at 12/3/2020 11:30 AM CST -----  Regarding: speak to nurse  Contact: patient  Type: Needs Medical Advice  Who Called:  patient wife Mary Beth   Symptoms (please be specific):  pain   How long has patient had these symptoms:  since last schedule appt   Pharmacy name and phone #:    Best Call Back Number: [phone] (home)       Additional Information:

## 2020-12-03 NOTE — TELEPHONE ENCOUNTER
Spoke with patients wife, patient is in a lot of back pain going down to his fingers. He has been to the ER twice with MRIs and xrays. Wife feels that patient does not want to live with pain anymore and will end up in a mental hospital or taking his life if he does not get seen for his pain. I advised him that even though we can get patient in to see Dr. Hollis on 12/15 that does not mean that his pain will be resolved that day and if it is that severe he will need to go to ER.

## 2020-12-07 ENCOUNTER — HOSPITAL ENCOUNTER (EMERGENCY)
Facility: HOSPITAL | Age: 45
Discharge: HOME | End: 2020-12-07
Attending: EMERGENCY MEDICINE
Payer: MEDICAID

## 2020-12-07 VITALS
HEIGHT: 73 IN | SYSTOLIC BLOOD PRESSURE: 122 MMHG | WEIGHT: 247 LBS | DIASTOLIC BLOOD PRESSURE: 88 MMHG | RESPIRATION RATE: 18 BRPM | TEMPERATURE: 99 F | HEART RATE: 86 BPM | BODY MASS INDEX: 32.74 KG/M2 | OXYGEN SATURATION: 97 %

## 2020-12-07 DIAGNOSIS — M54.12 CERVICAL RADICULOPATHY: Primary | ICD-10-CM

## 2020-12-07 PROCEDURE — 63600175 PHARM REV CODE 636 W HCPCS: Performed by: EMERGENCY MEDICINE

## 2020-12-07 PROCEDURE — 25000003 PHARM REV CODE 250: Performed by: EMERGENCY MEDICINE

## 2020-12-07 PROCEDURE — 99284 EMERGENCY DEPT VISIT MOD MDM: CPT | Mod: 25

## 2020-12-07 PROCEDURE — 96372 THER/PROPH/DIAG INJ SC/IM: CPT

## 2020-12-07 RX ORDER — OXYCODONE AND ACETAMINOPHEN 10; 325 MG/1; MG/1
1 TABLET ORAL EVERY 4 HOURS PRN
Qty: 18 TABLET | Refills: 0 | Status: SHIPPED | OUTPATIENT
Start: 2020-12-07 | End: 2020-12-10

## 2020-12-07 RX ORDER — ORPHENADRINE CITRATE 30 MG/ML
60 INJECTION INTRAMUSCULAR; INTRAVENOUS
Status: COMPLETED | OUTPATIENT
Start: 2020-12-07 | End: 2020-12-07

## 2020-12-07 RX ORDER — METHYLPREDNISOLONE SOD SUCC 125 MG
125 VIAL (EA) INJECTION
Status: COMPLETED | OUTPATIENT
Start: 2020-12-07 | End: 2020-12-07

## 2020-12-07 RX ORDER — ORPHENADRINE CITRATE 100 MG/1
100 TABLET, EXTENDED RELEASE ORAL 2 TIMES DAILY PRN
Qty: 20 TABLET | Refills: 0 | Status: SHIPPED | OUTPATIENT
Start: 2020-12-07 | End: 2020-12-15

## 2020-12-07 RX ORDER — OXYCODONE AND ACETAMINOPHEN 5; 325 MG/1; MG/1
2 TABLET ORAL
Status: COMPLETED | OUTPATIENT
Start: 2020-12-07 | End: 2020-12-07

## 2020-12-07 RX ORDER — METHYLPREDNISOLONE 4 MG/1
TABLET ORAL
Qty: 1 PACKAGE | Refills: 0 | Status: SHIPPED | OUTPATIENT
Start: 2020-12-07 | End: 2020-12-28

## 2020-12-07 RX ORDER — KETOROLAC TROMETHAMINE 30 MG/ML
30 INJECTION, SOLUTION INTRAMUSCULAR; INTRAVENOUS
Status: COMPLETED | OUTPATIENT
Start: 2020-12-07 | End: 2020-12-07

## 2020-12-07 RX ORDER — NAPROXEN 500 MG/1
500 TABLET ORAL 2 TIMES DAILY WITH MEALS
Qty: 20 TABLET | Refills: 0 | Status: SHIPPED | OUTPATIENT
Start: 2020-12-07 | End: 2020-12-17

## 2020-12-07 RX ADMIN — ORPHENADRINE CITRATE 60 MG: 30 INJECTION INTRAMUSCULAR; INTRAVENOUS at 08:12

## 2020-12-07 RX ADMIN — OXYCODONE HYDROCHLORIDE AND ACETAMINOPHEN 2 TABLET: 5; 325 TABLET ORAL at 08:12

## 2020-12-07 RX ADMIN — METHYLPREDNISOLONE SODIUM SUCCINATE 125 MG: 125 INJECTION, POWDER, FOR SOLUTION INTRAMUSCULAR; INTRAVENOUS at 08:12

## 2020-12-07 RX ADMIN — KETOROLAC TROMETHAMINE 30 MG: 30 INJECTION, SOLUTION INTRAMUSCULAR at 08:12

## 2020-12-08 NOTE — ED PROVIDER NOTES
Encounter Date: 12/7/2020    SCRIBE #1 NOTE: Carline BLANCO am scribing for, and in the presence of, Dominick Rodriguez MD.       History     Chief Complaint   Patient presents with    Arm Pain     appt on monday for MRI,EMG and neurologist. Pain management on Tuesday       Time seen by provider: 8:34 PM on 12/07/2020    Tommie Augustine is a 45 y.o. male who presents to the ED for evaluation of constant left-sided neck pain radiating down the left arm x3 weeks. He reports of left finger numbness, especially the left index finger. He notes having difficulty sleeping secondary to pain. He has an appt on Monday for MRI and EMG with neurologist, and he sees pain management on Tuesday. The patient denies back pain, fevers, or any other symptoms at this time. The patient denies injury or trauma. He has taken OTC pain medication without relief. PSHx of lumbar back surgery. Former smoker.    The history is provided by the patient.     Review of patient's allergies indicates:  No Known Allergies  Past Medical History:   Diagnosis Date    Seizures     as a child     Past Surgical History:   Procedure Laterality Date    BACK SURGERY  2003    replaced disk in lumbar spine     No family history on file.  Social History     Tobacco Use    Smoking status: Former Smoker    Smokeless tobacco: Never Used   Substance Use Topics    Alcohol use: Never     Frequency: Never    Drug use: Not on file     Review of Systems   Constitutional: Negative for fever.   HENT: Negative for congestion.    Eyes: Negative for visual disturbance.   Respiratory: Negative for wheezing.    Cardiovascular: Negative for chest pain.   Gastrointestinal: Negative for abdominal pain and nausea.   Genitourinary: Negative for dysuria.   Musculoskeletal: Positive for neck pain. Negative for back pain and joint swelling.        +arm pain   Skin: Negative for rash.   Neurological: Positive for numbness. Negative for syncope.   Hematological: Does not bruise/bleed  easily.   Psychiatric/Behavioral: Negative for confusion.       Physical Exam     Initial Vitals [12/07/20 2008]   BP Pulse Resp Temp SpO2   122/88 86 16 99.1 °F (37.3 °C) 97 %      MAP       --         Physical Exam    Nursing note and vitals reviewed.  Constitutional: He appears well-developed and well-nourished.   HENT:   Head: Normocephalic and atraumatic.   Eyes: Conjunctivae and EOM are normal.   Neck: Normal range of motion. Neck supple. No thyroid mass present.   Cardiovascular: Normal rate, regular rhythm and normal heart sounds. Exam reveals no gallop and no friction rub.    No murmur heard.  Pulmonary/Chest: Breath sounds normal. He has no wheezes. He has no rhonchi. He has no rales.   Abdominal: Normal appearance.   Musculoskeletal: Tenderness present.      Left shoulder: He exhibits tenderness.        Left hand: He exhibits normal capillary refill. Decreased sensation noted.      Comments: Reproducible tenderness over the left trapezius that radiates multiple dermatomes of the left arm. Second digit numbness with normal color and capillary refill.   Neurological: He is alert and oriented to person, place, and time. He has normal strength. He displays no atrophy. No cranial nerve deficit.   No unilateral muscle atrophy.   Skin: Skin is warm and dry. No rash noted. No erythema. No pallor.   Psychiatric: He has a normal mood and affect. His speech is normal. Cognition and memory are normal.         ED Course   Procedures  Labs Reviewed - No data to display       Imaging Results    None          Medical Decision Making:   History:   Old Medical Records: I decided to obtain old medical records.  ED Management:  This patient was emergently received and assessed upon arrival.  Initial vital signs are stable.  Patient history and examination was concerning for symptoms associated with left cervical radiculopathy.  He had marked improvement here with several medications.  I have low suspicion, considering  chronicity of symptoms, absence of hypertension, this is associated with occult life-threatening disease including aortic dissection, DVT.  He will be discharged with multiple medications for symptom control until he can receive his imaging studies, further consultation next week.  He is wife were educated about further supportive care and he is asked return to the ER immediately for any new, concerning, or worsening symptoms.  Both are in agreement with this plan and he is discharged in stable condition with his wife as a .            Scribe Attestation:   Scribe #1: I performed the above scribed service and the documentation accurately describes the services I performed. I attest to the accuracy of the note.    I, Dr. Dominick Rodriguez, personally performed the services described in this documentation. All medical record entries made by the scribe were at my direction and in my presence.  I have reviewed the chart and agree that the record reflects my personal performance and is accurate and complete. Dominick Rodriguez MD.  5:29 AM 12/08/2020                      Clinical Impression:     ICD-10-CM ICD-9-CM   1. Cervical radiculopathy  M54.12 723.4                      Disposition:   Disposition: Discharged  Condition: Stable     ED Disposition Condition    Discharge Stable        ED Prescriptions     Medication Sig Dispense Start Date End Date Auth. Provider    oxyCODONE-acetaminophen (PERCOCET)  mg per tablet Take 1 tablet by mouth every 4 (four) hours as needed for Pain. 18 tablet 12/7/2020 12/10/2020 Dominick Rodriguez MD    methylPREDNISolone (MEDROL DOSEPACK) 4 mg tablet Follow package insert for dose tapering instructions 1 Package 12/7/2020 12/28/2020 Dominick Rodriguez MD    orphenadrine (NORFLEX) 100 mg tablet Take 1 tablet (100 mg total) by mouth 2 (two) times daily as needed for Muscle spasms or Pain. 20 tablet 12/7/2020 12/17/2020 Dominick Rodriguez MD    naproxen (NAPROSYN) 500 MG tablet Take 1 tablet  (500 mg total) by mouth 2 (two) times daily with meals. for 10 days 20 tablet 12/7/2020 12/17/2020 Dominick Rodriguez MD        Follow-up Information     Follow up With Specialties Details Why Contact Info    [facility] - OUT[facility]  Go in 1 week  [address] [facility] [id number]                                       Dominick Rodriguez MD  12/08/20 0508

## 2020-12-15 ENCOUNTER — OFFICE VISIT (OUTPATIENT)
Dept: PAIN MEDICINE | Facility: CLINIC | Age: 45
End: 2020-12-15
Payer: MEDICAID

## 2020-12-15 VITALS
OXYGEN SATURATION: 99 % | DIASTOLIC BLOOD PRESSURE: 77 MMHG | RESPIRATION RATE: 20 BRPM | HEART RATE: 65 BPM | BODY MASS INDEX: 32.49 KG/M2 | WEIGHT: 245.13 LBS | SYSTOLIC BLOOD PRESSURE: 116 MMHG | TEMPERATURE: 98 F | HEIGHT: 73 IN

## 2020-12-15 DIAGNOSIS — M54.12 CERVICAL RADICULOPATHY: Primary | ICD-10-CM

## 2020-12-15 DIAGNOSIS — M50.30 DDD (DEGENERATIVE DISC DISEASE), CERVICAL: ICD-10-CM

## 2020-12-15 DIAGNOSIS — F32.0 CURRENT MILD EPISODE OF MAJOR DEPRESSIVE DISORDER WITHOUT PRIOR EPISODE: ICD-10-CM

## 2020-12-15 DIAGNOSIS — M47.812 CERVICAL SPONDYLOSIS: ICD-10-CM

## 2020-12-15 PROCEDURE — 99204 PR OFFICE/OUTPT VISIT, NEW, LEVL IV, 45-59 MIN: ICD-10-PCS | Mod: S$PBB,,, | Performed by: ANESTHESIOLOGY

## 2020-12-15 PROCEDURE — 99213 OFFICE O/P EST LOW 20 MIN: CPT | Mod: PBBFAC,PN | Performed by: ANESTHESIOLOGY

## 2020-12-15 PROCEDURE — 99204 OFFICE O/P NEW MOD 45 MIN: CPT | Mod: S$PBB,,, | Performed by: ANESTHESIOLOGY

## 2020-12-15 PROCEDURE — 99999 PR PBB SHADOW E&M-EST. PATIENT-LVL III: ICD-10-PCS | Mod: PBBFAC,,, | Performed by: ANESTHESIOLOGY

## 2020-12-15 PROCEDURE — 99999 PR PBB SHADOW E&M-EST. PATIENT-LVL III: CPT | Mod: PBBFAC,,, | Performed by: ANESTHESIOLOGY

## 2020-12-15 RX ORDER — GABAPENTIN 300 MG/1
300 CAPSULE ORAL 3 TIMES DAILY
Qty: 90 CAPSULE | Refills: 3 | Status: SHIPPED | OUTPATIENT
Start: 2020-12-15 | End: 2021-12-15

## 2020-12-15 RX ORDER — CYCLOBENZAPRINE HCL 5 MG
5 TABLET ORAL
COMMUNITY

## 2020-12-15 RX ORDER — METHOCARBAMOL 500 MG/1
500 TABLET, FILM COATED ORAL
COMMUNITY

## 2020-12-15 NOTE — PROGRESS NOTES
This note was completed with dictation software and grammatical errors may exist.    CC:Neck pain, left arm pain    HPI:  The patient is a 45-year-old man with a history of L2/3 fusion after a long fall, presents in self-referral for neck pain radiating into the left arm.  The patient is accompanied by his wife who helps with a history as the patient does not talk much.  He developed this pain about 2 months ago, denies any specific trauma that may have caused this.  Pain is located in the left neck radiating down the left shoulder into the left hand.  He states that his index finger is constantly numb but he has pain and burning and numbness throughout the entire hand.  He reports having some weakness in the hand.  He has difficulty with turning his head certain ways because it shoots pain down to his hand.  He denies any symptoms on the right side.  He describes it as aching, burning, throbbing, tingling, deep, numb, sharp, electric and shooting.  Is worse with lying down, extending or flexing his neck doing any lifting or getting out of a bed or a chair.  He reports some relief with sitting down, medications and injections.  He was given a Medrol Dosepak at 1 time and this did help for several days.  His wife had given him some of her gabapentin 100 milligrams but this did not seem to help.  Unfortunately, he has not been able to work as a  due to the severe pain and this is causing depression.  He was set up to go to physical therapy but once he got there he filled out some of the paperwork and decided to leave mostly due to his mood.  Because of this, it his insurance did not approve the cervical MRI and so he needs to get this approved again.    Pain intervention history:  No injections    Antineuropathics:  Had taken gabapentin 100 milligrams once a day without relief  NSAIDs:  Naproxen help slightly  Physical therapy:  Has not started physical therapy  Antidepressants:  Has been referred  to psychiatry for possible treatment of depression  Muscle relaxers:  Flexeril and Robaxin only slight relief  Opioids:  Antiplatelets/Anticoagulants:        ROS:  He reports skin color change, appetite change, back pain, difficulty sleeping, anxiety, depression and suicidal thoughts at times.  He denies any suicidal attempts, does not have a plan.  Balance of review of systems is negative.    No results found for: LABA1C, HGBA1C    Lab Results   Component Value Date    WBC 7.50 05/15/2018    HGB 13.9 (L) 05/15/2018    HCT 40.3 05/15/2018    MCV 85 05/15/2018     05/15/2018             Past Medical History:   Diagnosis Date    Seizures     as a child       Past Surgical History:   Procedure Laterality Date    BACK SURGERY  2003    replaced disk in lumbar spine       Social History     Socioeconomic History    Marital status:      Spouse name: Not on file    Number of children: Not on file    Years of education: Not on file    Highest education level: Not on file   Occupational History    Not on file   Social Needs    Financial resource strain: Not on file    Food insecurity     Worry: Not on file     Inability: Not on file    Transportation needs     Medical: Not on file     Non-medical: Not on file   Tobacco Use    Smoking status: Former Smoker    Smokeless tobacco: Never Used   Substance and Sexual Activity    Alcohol use: Never     Frequency: Never    Drug use: Not on file    Sexual activity: Not on file   Lifestyle    Physical activity     Days per week: Not on file     Minutes per session: Not on file    Stress: Not on file   Relationships    Social connections     Talks on phone: Not on file     Gets together: Not on file     Attends Jain service: Not on file     Active member of club or organization: Not on file     Attends meetings of clubs or organizations: Not on file     Relationship status: Not on file   Other Topics Concern    Not on file   Social History Narrative  "   Not on file         Medications/Allergies: See med card    Vitals:    12/15/20 1037   BP: 116/77   Pulse: 65   Resp: 20   Temp: 98.4 °F (36.9 °C)   TempSrc: Temporal   SpO2: 99%   Weight: 111.2 kg (245 lb 2.4 oz)   Height: 6' 1" (1.854 m)   PainSc:   8   PainLoc: Arm         Physical exam:  Gen: A and O x3, pleasant, well-groomed  Skin: No rashes or obvious lesions  HEENT: PERRLA, no obvious deformities on ears or in canals.Trachea midline.  CVS: Regular rate and rhythm, normal palpable pulses.  Resp: Clear to auscultation bilaterally, no wheezes or rales.  Abdomen: Soft, NT/ND.  Musculoskeletal: Able to heel walk, toe walk. No antalgic gait.     Neuro:  Upper extremities: 5/5 strength bilaterally, except for left bicep and tricep 4/5   Reflexes: Brachioradialis 2+, Bicep 2+, Tricep 2+ bilaterally except for left bicep 0+.   Sensory: Intact and symmetrical to light touch and pinprick in C2-T1 dermatomes except for left index finger no sensation and decreased sensation to light touch and pinprick over the rest of the left hand    Cervical Spine:  Cervical spine:  Range of motion is moderately reduced with both flexion and extension with increased pain on extension and especially with the lateral rotation to either side.   Spurling's maneuver causes left neck and arm pain.    Myofascial exam:  Tenderness to palpation across left cervical paraspinal region.    Imagin20 Xray L-spine:  The patient is somewhat tilted.  There has been interbody fusion at the L2-3 level.  There is associated endplate sclerosis.  The vertebral bodies maintain normal height and alignment.  There is mild disc space narrowing at the lower 2 lumbar levels.  The facet joints maintain normal articulation.  There is mild facet joint arthropathy at the L4-5 and L5-S1 levels.    Assessment:   The patient is a 45-year-old man with a history of L2/3 fusion after a long fall, presents in self-referral for neck pain radiating into the left " arm.     1. Cervical radiculopathy     2. DDD (degenerative disc disease), cervical     3. Cervical spondylosis     4. Current mild episode of major depressive disorder without prior episode         Plan:  1.  We discussed that since he has weakness in his left arm, I think it is imperative that he gets the MRI of the cervical spine completed.  We discussed that if his weakness is not improving with physical therapy, medications and epidural steroid injections that he may need to have surgery to correct this.  Fortunately he does not have any other myelopathic symptoms at this time so it is not an emergency.  I think it would be worthwhile to continue with physical therapy, I am going to start gabapentin 300 milligrams nightly gradually increased in to take it 3 times daily.  Once he has had his MRI, we may consider an epidural steroid injection.  He will need to get set up with the Pain Management program through his insurance that allows interventional procedures to be performed.  2.  I am going to have him contact me once his MRI is completed but set him up with a one-month follow-up just fall back on.

## 2020-12-21 DIAGNOSIS — Z98.1 HISTORY OF LUMBAR SPINAL FUSION: Primary | ICD-10-CM

## 2020-12-21 DIAGNOSIS — M47.812 CERVICAL SPONDYLOSIS: ICD-10-CM

## 2020-12-28 DIAGNOSIS — G56.03 BILATERAL CARPAL TUNNEL SYNDROME: Primary | ICD-10-CM

## 2021-01-05 ENCOUNTER — CLINICAL SUPPORT (OUTPATIENT)
Dept: REHABILITATION | Facility: HOSPITAL | Age: 46
End: 2021-01-05
Attending: NURSE PRACTITIONER
Payer: MEDICAID

## 2021-01-05 DIAGNOSIS — M62.89 ABNORMAL INCREASED MUSCLE TONE: ICD-10-CM

## 2021-01-05 DIAGNOSIS — G56.03 BILATERAL CARPAL TUNNEL SYNDROME: Primary | ICD-10-CM

## 2021-01-05 DIAGNOSIS — M62.81 MUSCLE WEAKNESS: ICD-10-CM

## 2021-01-05 DIAGNOSIS — M54.2 NECK PAIN: Primary | ICD-10-CM

## 2021-01-05 PROBLEM — M79.601 RIGHT ARM PAIN: Status: RESOLVED | Noted: 2020-12-03 | Resolved: 2021-01-05

## 2021-01-05 PROBLEM — R29.898 DECREASED GRIP STRENGTH OF LEFT HAND: Status: RESOLVED | Noted: 2020-12-03 | Resolved: 2021-01-05

## 2021-01-05 PROBLEM — M53.82 DECREASED RANGE OF MOTION OF INTERVERTEBRAL DISCS OF CERVICAL SPINE: Status: RESOLVED | Noted: 2020-12-03 | Resolved: 2021-01-05

## 2021-01-05 PROBLEM — R29.898 DECREASED STRENGTH OF UPPER EXTREMITY: Status: RESOLVED | Noted: 2020-12-03 | Resolved: 2021-01-05

## 2021-01-05 PROBLEM — R29.3 POOR POSTURE: Status: RESOLVED | Noted: 2020-12-03 | Resolved: 2021-01-05

## 2021-01-05 PROCEDURE — 97162 PT EVAL MOD COMPLEX 30 MIN: CPT

## 2021-01-05 PROCEDURE — 97165 OT EVAL LOW COMPLEX 30 MIN: CPT

## 2021-01-06 PROBLEM — G56.03 BILATERAL CARPAL TUNNEL SYNDROME: Status: ACTIVE | Noted: 2021-01-06

## 2021-01-12 ENCOUNTER — DOCUMENTATION ONLY (OUTPATIENT)
Dept: REHABILITATION | Facility: HOSPITAL | Age: 46
End: 2021-01-12

## 2021-01-13 ENCOUNTER — DOCUMENTATION ONLY (OUTPATIENT)
Dept: REHABILITATION | Facility: HOSPITAL | Age: 46
End: 2021-01-13

## 2021-01-14 ENCOUNTER — TELEPHONE (OUTPATIENT)
Dept: PAIN MEDICINE | Facility: CLINIC | Age: 46
End: 2021-01-14

## 2021-01-14 ENCOUNTER — OFFICE VISIT (OUTPATIENT)
Dept: PAIN MEDICINE | Facility: CLINIC | Age: 46
End: 2021-01-14
Payer: MEDICAID

## 2021-01-14 ENCOUNTER — HOSPITAL ENCOUNTER (OUTPATIENT)
Dept: RADIOLOGY | Facility: HOSPITAL | Age: 46
Discharge: HOME | End: 2021-01-14
Attending: ANESTHESIOLOGY
Payer: MEDICAID

## 2021-01-14 VITALS
DIASTOLIC BLOOD PRESSURE: 61 MMHG | OXYGEN SATURATION: 99 % | RESPIRATION RATE: 20 BRPM | HEART RATE: 63 BPM | TEMPERATURE: 98 F | WEIGHT: 244.63 LBS | SYSTOLIC BLOOD PRESSURE: 112 MMHG | BODY MASS INDEX: 32.27 KG/M2

## 2021-01-14 DIAGNOSIS — M54.12 CERVICAL RADICULOPATHY: Primary | ICD-10-CM

## 2021-01-14 DIAGNOSIS — G56.03 BILATERAL CARPAL TUNNEL SYNDROME: ICD-10-CM

## 2021-01-14 DIAGNOSIS — M51.36 DDD (DEGENERATIVE DISC DISEASE), LUMBAR: ICD-10-CM

## 2021-01-14 DIAGNOSIS — M54.2 CERVICALGIA: ICD-10-CM

## 2021-01-14 DIAGNOSIS — M54.12 CERVICAL RADICULOPATHY: ICD-10-CM

## 2021-01-14 DIAGNOSIS — M50.30 DDD (DEGENERATIVE DISC DISEASE), CERVICAL: ICD-10-CM

## 2021-01-14 PROCEDURE — 99214 PR OFFICE/OUTPT VISIT, EST, LEVL IV, 30-39 MIN: ICD-10-PCS | Mod: S$PBB,,, | Performed by: ANESTHESIOLOGY

## 2021-01-14 PROCEDURE — 99999 PR PBB SHADOW E&M-EST. PATIENT-LVL III: CPT | Mod: PBBFAC,,, | Performed by: ANESTHESIOLOGY

## 2021-01-14 PROCEDURE — 72050 X-RAY EXAM NECK SPINE 4/5VWS: CPT | Mod: 26,,, | Performed by: RADIOLOGY

## 2021-01-14 PROCEDURE — 72050 X-RAY EXAM NECK SPINE 4/5VWS: CPT | Mod: TC,FY,PO

## 2021-01-14 PROCEDURE — 99999 PR PBB SHADOW E&M-EST. PATIENT-LVL III: ICD-10-PCS | Mod: PBBFAC,,, | Performed by: ANESTHESIOLOGY

## 2021-01-14 PROCEDURE — 99213 OFFICE O/P EST LOW 20 MIN: CPT | Mod: PBBFAC,25,PN | Performed by: ANESTHESIOLOGY

## 2021-01-14 PROCEDURE — 99214 OFFICE O/P EST MOD 30 MIN: CPT | Mod: S$PBB,,, | Performed by: ANESTHESIOLOGY

## 2021-01-14 PROCEDURE — 72050 XR CERVICAL SPINE COMPLETE 5 VIEW: ICD-10-PCS | Mod: 26,,, | Performed by: RADIOLOGY

## 2021-01-14 RX ORDER — GABAPENTIN 300 MG/1
600 CAPSULE ORAL 3 TIMES DAILY
Qty: 180 CAPSULE | Refills: 2 | Status: SHIPPED | OUTPATIENT
Start: 2021-01-14 | End: 2022-01-14

## 2021-01-14 RX ORDER — TIZANIDINE 4 MG/1
4 TABLET ORAL NIGHTLY PRN
Qty: 30 TABLET | Refills: 1 | Status: SHIPPED | OUTPATIENT
Start: 2021-01-14 | End: 2021-02-13

## 2021-04-27 ENCOUNTER — HOSPITAL ENCOUNTER (EMERGENCY)
Facility: HOSPITAL | Age: 46
Discharge: HOME | End: 2021-04-27
Attending: EMERGENCY MEDICINE
Payer: MEDICAID

## 2021-04-27 VITALS
WEIGHT: 230 LBS | OXYGEN SATURATION: 99 % | HEIGHT: 73 IN | TEMPERATURE: 99 F | HEART RATE: 73 BPM | DIASTOLIC BLOOD PRESSURE: 77 MMHG | BODY MASS INDEX: 30.48 KG/M2 | SYSTOLIC BLOOD PRESSURE: 111 MMHG | RESPIRATION RATE: 16 BRPM

## 2021-04-27 DIAGNOSIS — M79.644 PAIN OF FINGER OF RIGHT HAND: Primary | ICD-10-CM

## 2021-04-27 PROCEDURE — 99283 EMERGENCY DEPT VISIT LOW MDM: CPT | Mod: 25

## 2021-04-27 RX ORDER — HYDROCODONE BITARTRATE AND ACETAMINOPHEN 5; 325 MG/1; MG/1
1 TABLET ORAL EVERY 8 HOURS PRN
Qty: 9 TABLET | Refills: 0 | Status: SHIPPED | OUTPATIENT
Start: 2021-04-27

## 2021-06-10 ENCOUNTER — DOCUMENTATION ONLY (OUTPATIENT)
Dept: REHABILITATION | Facility: HOSPITAL | Age: 46
End: 2021-06-10

## 2021-06-10 PROBLEM — M62.89 ABNORMAL INCREASED MUSCLE TONE: Status: RESOLVED | Noted: 2021-01-05 | Resolved: 2021-06-10

## 2021-06-10 PROBLEM — M54.2 NECK PAIN: Status: RESOLVED | Noted: 2021-01-05 | Resolved: 2021-06-10

## 2021-06-10 PROBLEM — M62.81 MUSCLE WEAKNESS: Status: RESOLVED | Noted: 2021-01-05 | Resolved: 2021-06-10

## 2021-10-31 ENCOUNTER — HOSPITAL ENCOUNTER (EMERGENCY)
Facility: HOSPITAL | Age: 46
Discharge: HOME | End: 2021-10-31
Attending: EMERGENCY MEDICINE
Payer: MEDICAID

## 2021-10-31 VITALS
WEIGHT: 245 LBS | OXYGEN SATURATION: 98 % | RESPIRATION RATE: 18 BRPM | HEIGHT: 73 IN | BODY MASS INDEX: 32.47 KG/M2 | HEART RATE: 68 BPM | SYSTOLIC BLOOD PRESSURE: 136 MMHG | DIASTOLIC BLOOD PRESSURE: 77 MMHG | TEMPERATURE: 98 F

## 2021-10-31 DIAGNOSIS — R56.9 SEIZURE: ICD-10-CM

## 2021-10-31 DIAGNOSIS — R56.9 SEIZURE-LIKE ACTIVITY: Primary | ICD-10-CM

## 2021-10-31 LAB
ALBUMIN SERPL BCP-MCNC: 3.9 G/DL (ref 3.5–5.2)
ALP SERPL-CCNC: 62 U/L (ref 55–135)
ALT SERPL W/O P-5'-P-CCNC: 18 U/L (ref 10–44)
AMPHET+METHAMPHET UR QL: NEGATIVE
ANION GAP SERPL CALC-SCNC: 5 MMOL/L (ref 8–16)
AST SERPL-CCNC: 13 U/L (ref 10–40)
BARBITURATES UR QL SCN>200 NG/ML: NEGATIVE
BASOPHILS # BLD AUTO: 0.05 K/UL (ref 0–0.2)
BASOPHILS NFR BLD: 0.8 % (ref 0–1.9)
BENZODIAZ UR QL SCN>200 NG/ML: NEGATIVE
BILIRUB SERPL-MCNC: 0.4 MG/DL (ref 0.1–1)
BILIRUB UR QL STRIP: NEGATIVE
BUN SERPL-MCNC: 13 MG/DL (ref 6–20)
BZE UR QL SCN: NEGATIVE
CALCIUM SERPL-MCNC: 9.4 MG/DL (ref 8.7–10.5)
CANNABINOIDS UR QL SCN: NEGATIVE
CHLORIDE SERPL-SCNC: 109 MMOL/L (ref 95–110)
CLARITY UR: CLEAR
CO2 SERPL-SCNC: 27 MMOL/L (ref 23–29)
COLOR UR: YELLOW
CREAT SERPL-MCNC: 0.9 MG/DL (ref 0.5–1.4)
CREAT UR-MCNC: 89 MG/DL (ref 23–375)
DIFFERENTIAL METHOD: NORMAL
EOSINOPHIL # BLD AUTO: 0.3 K/UL (ref 0–0.5)
EOSINOPHIL NFR BLD: 4.1 % (ref 0–8)
ERYTHROCYTE [DISTWIDTH] IN BLOOD BY AUTOMATED COUNT: 13.4 % (ref 11.5–14.5)
EST. GFR  (AFRICAN AMERICAN): >60 ML/MIN/1.73 M^2
EST. GFR  (NON AFRICAN AMERICAN): >60 ML/MIN/1.73 M^2
ETHANOL SERPL-MCNC: <5 MG/DL
GLUCOSE SERPL-MCNC: 113 MG/DL (ref 70–110)
GLUCOSE UR QL STRIP: NEGATIVE
HCT VFR BLD AUTO: 43 % (ref 40–54)
HGB BLD-MCNC: 14.8 G/DL (ref 14–18)
HGB UR QL STRIP: NEGATIVE
IMM GRANULOCYTES # BLD AUTO: 0.01 K/UL (ref 0–0.04)
IMM GRANULOCYTES NFR BLD AUTO: 0.2 % (ref 0–0.5)
INR PPP: 1
KETONES UR QL STRIP: NEGATIVE
LEUKOCYTE ESTERASE UR QL STRIP: NEGATIVE
LYMPHOCYTES # BLD AUTO: 2.7 K/UL (ref 1–4.8)
LYMPHOCYTES NFR BLD: 42.3 % (ref 18–48)
MAGNESIUM SERPL-MCNC: 2 MG/DL (ref 1.6–2.6)
MCH RBC QN AUTO: 29.4 PG (ref 27–31)
MCHC RBC AUTO-ENTMCNC: 34.4 G/DL (ref 32–36)
MCV RBC AUTO: 85 FL (ref 82–98)
MONOCYTES # BLD AUTO: 0.6 K/UL (ref 0.3–1)
MONOCYTES NFR BLD: 9.2 % (ref 4–15)
NEUTROPHILS # BLD AUTO: 2.8 K/UL (ref 1.8–7.7)
NEUTROPHILS NFR BLD: 43.4 % (ref 38–73)
NITRITE UR QL STRIP: NEGATIVE
NRBC BLD-RTO: 0 /100 WBC
OPIATES UR QL SCN: NEGATIVE
PCP UR QL SCN>25 NG/ML: NEGATIVE
PH UR STRIP: 6 [PH] (ref 5–8)
PLATELET # BLD AUTO: 212 K/UL (ref 150–450)
PMV BLD AUTO: 10.8 FL (ref 9.2–12.9)
POTASSIUM SERPL-SCNC: 3.9 MMOL/L (ref 3.5–5.1)
PROT SERPL-MCNC: 6.9 G/DL (ref 6–8.4)
PROT UR QL STRIP: NEGATIVE
PROTHROMBIN TIME: 12 SEC (ref 11.4–13.7)
RBC # BLD AUTO: 5.04 M/UL (ref 4.6–6.2)
SODIUM SERPL-SCNC: 141 MMOL/L (ref 136–145)
SP GR UR STRIP: 1.01 (ref 1–1.03)
TOXICOLOGY INFORMATION: NORMAL
TROPONIN I SERPL DL<=0.01 NG/ML-MCNC: <0.03 NG/ML
TSH SERPL DL<=0.005 MIU/L-ACNC: 2.73 UIU/ML (ref 0.34–5.6)
URN SPEC COLLECT METH UR: NORMAL
UROBILINOGEN UR STRIP-ACNC: NEGATIVE EU/DL
WBC # BLD AUTO: 6.33 K/UL (ref 3.9–12.7)

## 2021-10-31 PROCEDURE — 80053 COMPREHEN METABOLIC PANEL: CPT | Performed by: EMERGENCY MEDICINE

## 2021-10-31 PROCEDURE — 93005 ELECTROCARDIOGRAM TRACING: CPT | Performed by: INTERNAL MEDICINE

## 2021-10-31 PROCEDURE — 82077 ASSAY SPEC XCP UR&BREATH IA: CPT | Performed by: EMERGENCY MEDICINE

## 2021-10-31 PROCEDURE — 85025 COMPLETE CBC W/AUTO DIFF WBC: CPT | Performed by: EMERGENCY MEDICINE

## 2021-10-31 PROCEDURE — 83735 ASSAY OF MAGNESIUM: CPT | Performed by: EMERGENCY MEDICINE

## 2021-10-31 PROCEDURE — 84484 ASSAY OF TROPONIN QUANT: CPT | Performed by: EMERGENCY MEDICINE

## 2021-10-31 PROCEDURE — 93010 ELECTROCARDIOGRAM REPORT: CPT | Mod: ,,, | Performed by: INTERNAL MEDICINE

## 2021-10-31 PROCEDURE — 85610 PROTHROMBIN TIME: CPT | Performed by: EMERGENCY MEDICINE

## 2021-10-31 PROCEDURE — 81003 URINALYSIS AUTO W/O SCOPE: CPT | Mod: 59 | Performed by: EMERGENCY MEDICINE

## 2021-10-31 PROCEDURE — 93010 EKG 12-LEAD: ICD-10-PCS | Mod: ,,, | Performed by: INTERNAL MEDICINE

## 2021-10-31 PROCEDURE — 80307 DRUG TEST PRSMV CHEM ANLYZR: CPT | Performed by: EMERGENCY MEDICINE

## 2021-10-31 PROCEDURE — 99285 EMERGENCY DEPT VISIT HI MDM: CPT | Mod: 25

## 2021-10-31 PROCEDURE — 84443 ASSAY THYROID STIM HORMONE: CPT | Performed by: EMERGENCY MEDICINE

## 2021-11-09 DIAGNOSIS — R56.9 SEIZURE: Primary | ICD-10-CM

## 2021-12-13 ENCOUNTER — HOSPITAL ENCOUNTER (EMERGENCY)
Facility: HOSPITAL | Age: 46
Discharge: HOME | End: 2021-12-13
Attending: EMERGENCY MEDICINE
Payer: MEDICAID

## 2021-12-13 VITALS
HEIGHT: 73 IN | TEMPERATURE: 99 F | OXYGEN SATURATION: 95 % | WEIGHT: 245 LBS | HEART RATE: 75 BPM | BODY MASS INDEX: 32.47 KG/M2 | SYSTOLIC BLOOD PRESSURE: 128 MMHG | RESPIRATION RATE: 16 BRPM | DIASTOLIC BLOOD PRESSURE: 81 MMHG

## 2021-12-13 DIAGNOSIS — B02.9 HERPES ZOSTER WITHOUT COMPLICATION: Primary | ICD-10-CM

## 2021-12-13 PROCEDURE — 99283 EMERGENCY DEPT VISIT LOW MDM: CPT

## 2021-12-13 PROCEDURE — 25000003 PHARM REV CODE 250: Performed by: STUDENT IN AN ORGANIZED HEALTH CARE EDUCATION/TRAINING PROGRAM

## 2021-12-13 RX ORDER — VALACYCLOVIR HYDROCHLORIDE 1 G/1
1000 TABLET, FILM COATED ORAL 3 TIMES DAILY
Qty: 21 TABLET | Refills: 0 | Status: SHIPPED | OUTPATIENT
Start: 2021-12-13 | End: 2021-12-27

## 2021-12-13 RX ORDER — GABAPENTIN 300 MG/1
300 CAPSULE ORAL ONCE
Status: COMPLETED | OUTPATIENT
Start: 2021-12-13 | End: 2021-12-13

## 2021-12-13 RX ORDER — GABAPENTIN 300 MG/1
300 CAPSULE ORAL 3 TIMES DAILY
Qty: 30 CAPSULE | Refills: 0 | Status: SHIPPED | OUTPATIENT
Start: 2021-12-13 | End: 2021-12-20

## 2021-12-13 RX ORDER — VALACYCLOVIR HYDROCHLORIDE 500 MG/1
1000 TABLET, FILM COATED ORAL ONCE
Status: COMPLETED | OUTPATIENT
Start: 2021-12-13 | End: 2021-12-13

## 2021-12-13 RX ADMIN — GABAPENTIN 300 MG: 300 CAPSULE ORAL at 08:12

## 2021-12-13 RX ADMIN — VALACYCLOVIR HYDROCHLORIDE 1000 MG: 500 TABLET, FILM COATED ORAL at 08:12

## 2021-12-14 ENCOUNTER — HOSPITAL ENCOUNTER (EMERGENCY)
Facility: HOSPITAL | Age: 46
Discharge: HOME | End: 2021-12-14
Attending: EMERGENCY MEDICINE
Payer: MEDICAID

## 2021-12-14 VITALS
BODY MASS INDEX: 31.81 KG/M2 | WEIGHT: 240 LBS | TEMPERATURE: 99 F | RESPIRATION RATE: 15 BRPM | HEART RATE: 70 BPM | HEIGHT: 73 IN | OXYGEN SATURATION: 98 % | SYSTOLIC BLOOD PRESSURE: 120 MMHG | DIASTOLIC BLOOD PRESSURE: 72 MMHG

## 2021-12-14 DIAGNOSIS — B02.9 ZOSTER: ICD-10-CM

## 2021-12-14 LAB
ADENOVIRUS: NOT DETECTED
ALBUMIN SERPL BCP-MCNC: 4 G/DL (ref 3.5–5.2)
ALP SERPL-CCNC: 57 U/L (ref 55–135)
ALT SERPL W/O P-5'-P-CCNC: 20 U/L (ref 10–44)
ANION GAP SERPL CALC-SCNC: 7 MMOL/L (ref 8–16)
APTT PPP: 31.3 SEC (ref 23.3–35.1)
AST SERPL-CCNC: 17 U/L (ref 10–40)
BASOPHILS # BLD AUTO: 0.03 K/UL (ref 0–0.2)
BASOPHILS NFR BLD: 0.5 % (ref 0–1.9)
BILIRUB SERPL-MCNC: 0.5 MG/DL (ref 0.1–1)
BNP SERPL-MCNC: 37 PG/ML (ref 0–99)
BORDETELLA PARAPERTUSSIS (IS1001): NOT DETECTED
BORDETELLA PERTUSSIS (PTXP): NOT DETECTED
BUN SERPL-MCNC: 12 MG/DL (ref 6–20)
CALCIUM SERPL-MCNC: 8.9 MG/DL (ref 8.7–10.5)
CHLAMYDIA PNEUMONIAE: NOT DETECTED
CHLORIDE SERPL-SCNC: 102 MMOL/L (ref 95–110)
CK SERPL-CCNC: 117 U/L (ref 20–200)
CO2 SERPL-SCNC: 26 MMOL/L (ref 23–29)
CORONAVIRUS 229E, COMMON COLD VIRUS: NOT DETECTED
CORONAVIRUS HKU1, COMMON COLD VIRUS: NOT DETECTED
CORONAVIRUS NL63, COMMON COLD VIRUS: NOT DETECTED
CORONAVIRUS OC43, COMMON COLD VIRUS: NOT DETECTED
CREAT SERPL-MCNC: 1.1 MG/DL (ref 0.5–1.4)
DIFFERENTIAL METHOD: ABNORMAL
EOSINOPHIL # BLD AUTO: 0.2 K/UL (ref 0–0.5)
EOSINOPHIL NFR BLD: 3.9 % (ref 0–8)
ERYTHROCYTE [DISTWIDTH] IN BLOOD BY AUTOMATED COUNT: 13 % (ref 11.5–14.5)
EST. GFR  (AFRICAN AMERICAN): >60 ML/MIN/1.73 M^2
EST. GFR  (NON AFRICAN AMERICAN): >60 ML/MIN/1.73 M^2
FLUBV RNA NPH QL NAA+NON-PROBE: NOT DETECTED
GLUCOSE SERPL-MCNC: 95 MG/DL (ref 70–110)
GROUP A STREP, MOLECULAR: NEGATIVE
HCT VFR BLD AUTO: 40.6 % (ref 40–54)
HGB BLD-MCNC: 13.9 G/DL (ref 14–18)
HPIV1 RNA NPH QL NAA+NON-PROBE: NOT DETECTED
HPIV2 RNA NPH QL NAA+NON-PROBE: NOT DETECTED
HPIV3 RNA NPH QL NAA+NON-PROBE: NOT DETECTED
HPIV4 RNA NPH QL NAA+NON-PROBE: NOT DETECTED
HUMAN METAPNEUMOVIRUS: NOT DETECTED
IMM GRANULOCYTES # BLD AUTO: 0.01 K/UL (ref 0–0.04)
IMM GRANULOCYTES NFR BLD AUTO: 0.2 % (ref 0–0.5)
INFLUENZA A (SUBTYPES H1,H1-2009,H3): NOT DETECTED
INFLUENZA A, MOLECULAR: NEGATIVE
INFLUENZA B, MOLECULAR: NEGATIVE
INR PPP: 1.1
LIPASE SERPL-CCNC: 29 U/L (ref 4–60)
LYMPHOCYTES # BLD AUTO: 1.6 K/UL (ref 1–4.8)
LYMPHOCYTES NFR BLD: 27.8 % (ref 18–48)
MAGNESIUM SERPL-MCNC: 1.9 MG/DL (ref 1.6–2.6)
MCH RBC QN AUTO: 28.9 PG (ref 27–31)
MCHC RBC AUTO-ENTMCNC: 34.2 G/DL (ref 32–36)
MCV RBC AUTO: 84 FL (ref 82–98)
MONOCYTES # BLD AUTO: 0.9 K/UL (ref 0.3–1)
MONOCYTES NFR BLD: 15.8 % (ref 4–15)
MYCOPLASMA PNEUMONIAE: NOT DETECTED
NEUTROPHILS # BLD AUTO: 3 K/UL (ref 1.8–7.7)
NEUTROPHILS NFR BLD: 51.8 % (ref 38–73)
NRBC BLD-RTO: 0 /100 WBC
PLATELET # BLD AUTO: 181 K/UL (ref 150–450)
PMV BLD AUTO: 9.6 FL (ref 9.2–12.9)
POTASSIUM SERPL-SCNC: 4.4 MMOL/L (ref 3.5–5.1)
PROT SERPL-MCNC: 7.4 G/DL (ref 6–8.4)
PROTHROMBIN TIME: 13 SEC (ref 11.4–13.7)
RBC # BLD AUTO: 4.81 M/UL (ref 4.6–6.2)
RESPIRATORY INFECTION PANEL SOURCE: NORMAL
RSV RNA NPH QL NAA+NON-PROBE: NOT DETECTED
RV+EV RNA NPH QL NAA+NON-PROBE: NOT DETECTED
SARS-COV-2 RDRP RESP QL NAA+PROBE: NEGATIVE
SODIUM SERPL-SCNC: 135 MMOL/L (ref 136–145)
SPECIMEN SOURCE: NORMAL
TROPONIN I SERPL DL<=0.01 NG/ML-MCNC: <0.03 NG/ML
TSH SERPL DL<=0.005 MIU/L-ACNC: 1.9 UIU/ML (ref 0.34–5.6)
WBC # BLD AUTO: 5.69 K/UL (ref 3.9–12.7)

## 2021-12-14 PROCEDURE — 82550 ASSAY OF CK (CPK): CPT | Performed by: EMERGENCY MEDICINE

## 2021-12-14 PROCEDURE — 85610 PROTHROMBIN TIME: CPT | Performed by: EMERGENCY MEDICINE

## 2021-12-14 PROCEDURE — 83880 ASSAY OF NATRIURETIC PEPTIDE: CPT | Performed by: EMERGENCY MEDICINE

## 2021-12-14 PROCEDURE — 83690 ASSAY OF LIPASE: CPT | Performed by: EMERGENCY MEDICINE

## 2021-12-14 PROCEDURE — 85025 COMPLETE CBC W/AUTO DIFF WBC: CPT | Performed by: EMERGENCY MEDICINE

## 2021-12-14 PROCEDURE — 99284 EMERGENCY DEPT VISIT MOD MDM: CPT | Mod: 25

## 2021-12-14 PROCEDURE — 93010 ELECTROCARDIOGRAM REPORT: CPT | Mod: ,,, | Performed by: INTERNAL MEDICINE

## 2021-12-14 PROCEDURE — 84484 ASSAY OF TROPONIN QUANT: CPT | Performed by: EMERGENCY MEDICINE

## 2021-12-14 PROCEDURE — 96374 THER/PROPH/DIAG INJ IV PUSH: CPT

## 2021-12-14 PROCEDURE — 84443 ASSAY THYROID STIM HORMONE: CPT | Performed by: EMERGENCY MEDICINE

## 2021-12-14 PROCEDURE — 87502 INFLUENZA DNA AMP PROBE: CPT | Performed by: EMERGENCY MEDICINE

## 2021-12-14 PROCEDURE — 96375 TX/PRO/DX INJ NEW DRUG ADDON: CPT

## 2021-12-14 PROCEDURE — 87633 RESP VIRUS 12-25 TARGETS: CPT | Performed by: EMERGENCY MEDICINE

## 2021-12-14 PROCEDURE — 63600175 PHARM REV CODE 636 W HCPCS: Performed by: EMERGENCY MEDICINE

## 2021-12-14 PROCEDURE — 87798 DETECT AGENT NOS DNA AMP: CPT | Performed by: EMERGENCY MEDICINE

## 2021-12-14 PROCEDURE — 80053 COMPREHEN METABOLIC PANEL: CPT | Performed by: EMERGENCY MEDICINE

## 2021-12-14 PROCEDURE — 83735 ASSAY OF MAGNESIUM: CPT | Performed by: EMERGENCY MEDICINE

## 2021-12-14 PROCEDURE — 85730 THROMBOPLASTIN TIME PARTIAL: CPT | Performed by: EMERGENCY MEDICINE

## 2021-12-14 PROCEDURE — 93010 EKG 12-LEAD: ICD-10-PCS | Mod: ,,, | Performed by: INTERNAL MEDICINE

## 2021-12-14 PROCEDURE — 93005 ELECTROCARDIOGRAM TRACING: CPT | Performed by: INTERNAL MEDICINE

## 2021-12-14 PROCEDURE — 87651 STREP A DNA AMP PROBE: CPT | Performed by: EMERGENCY MEDICINE

## 2021-12-14 PROCEDURE — U0002 COVID-19 LAB TEST NON-CDC: HCPCS | Performed by: EMERGENCY MEDICINE

## 2021-12-14 RX ORDER — DIPHENHYDRAMINE HYDROCHLORIDE 50 MG/ML
50 INJECTION INTRAMUSCULAR; INTRAVENOUS
Status: COMPLETED | OUTPATIENT
Start: 2021-12-14 | End: 2021-12-14

## 2021-12-14 RX ORDER — IBUPROFEN 200 MG
800 TABLET ORAL
COMMUNITY

## 2021-12-14 RX ORDER — DOXYCYCLINE 100 MG/1
100 CAPSULE ORAL 2 TIMES DAILY
Qty: 20 CAPSULE | Refills: 0 | Status: SHIPPED | OUTPATIENT
Start: 2021-12-14 | End: 2021-12-24

## 2021-12-14 RX ORDER — METHYLPREDNISOLONE SOD SUCC 125 MG
125 VIAL (EA) INJECTION
Status: COMPLETED | OUTPATIENT
Start: 2021-12-14 | End: 2021-12-14

## 2021-12-14 RX ADMIN — METHYLPREDNISOLONE SODIUM SUCCINATE 125 MG: 125 INJECTION, POWDER, FOR SOLUTION INTRAMUSCULAR; INTRAVENOUS at 09:12

## 2021-12-14 RX ADMIN — DIPHENHYDRAMINE HYDROCHLORIDE 50 MG: 50 INJECTION INTRAMUSCULAR; INTRAVENOUS at 07:12

## 2021-12-16 ENCOUNTER — HOSPITAL ENCOUNTER (EMERGENCY)
Facility: HOSPITAL | Age: 46
Discharge: HOME | End: 2021-12-17
Attending: EMERGENCY MEDICINE
Payer: MEDICAID

## 2021-12-16 DIAGNOSIS — K92.0 HEMATEMESIS WITH NAUSEA: ICD-10-CM

## 2021-12-16 DIAGNOSIS — B02.9 HERPES ZOSTER WITHOUT COMPLICATION: Primary | ICD-10-CM

## 2021-12-16 DIAGNOSIS — G44.201 ACUTE INTRACTABLE TENSION-TYPE HEADACHE: ICD-10-CM

## 2021-12-16 LAB
BASOPHILS # BLD AUTO: 0.03 K/UL (ref 0–0.2)
BASOPHILS NFR BLD: 0.5 % (ref 0–1.9)
DIFFERENTIAL METHOD: ABNORMAL
EOSINOPHIL # BLD AUTO: 0.1 K/UL (ref 0–0.5)
EOSINOPHIL NFR BLD: 1.4 % (ref 0–8)
ERYTHROCYTE [DISTWIDTH] IN BLOOD BY AUTOMATED COUNT: 12.7 % (ref 11.5–14.5)
HCT VFR BLD AUTO: 38.6 % (ref 40–54)
HGB BLD-MCNC: 13.4 G/DL (ref 14–18)
IMM GRANULOCYTES # BLD AUTO: 0.01 K/UL (ref 0–0.04)
IMM GRANULOCYTES NFR BLD AUTO: 0.2 % (ref 0–0.5)
LYMPHOCYTES # BLD AUTO: 2 K/UL (ref 1–4.8)
LYMPHOCYTES NFR BLD: 31.3 % (ref 18–48)
MCH RBC QN AUTO: 28.8 PG (ref 27–31)
MCHC RBC AUTO-ENTMCNC: 34.7 G/DL (ref 32–36)
MCV RBC AUTO: 83 FL (ref 82–98)
MONOCYTES # BLD AUTO: 0.6 K/UL (ref 0.3–1)
MONOCYTES NFR BLD: 10.3 % (ref 4–15)
NEUTROPHILS # BLD AUTO: 3.5 K/UL (ref 1.8–7.7)
NEUTROPHILS NFR BLD: 56.3 % (ref 38–73)
NRBC BLD-RTO: 0 /100 WBC
OB PNL STL: NEGATIVE
PLATELET # BLD AUTO: 199 K/UL (ref 150–450)
PMV BLD AUTO: 9.4 FL (ref 9.2–12.9)
RBC # BLD AUTO: 4.65 M/UL (ref 4.6–6.2)
WBC # BLD AUTO: 6.24 K/UL (ref 3.9–12.7)

## 2021-12-16 PROCEDURE — 25000003 PHARM REV CODE 250: Performed by: GENERAL PRACTICE

## 2021-12-16 PROCEDURE — 96360 HYDRATION IV INFUSION INIT: CPT

## 2021-12-16 PROCEDURE — 85025 COMPLETE CBC W/AUTO DIFF WBC: CPT | Performed by: GENERAL PRACTICE

## 2021-12-16 PROCEDURE — 99285 EMERGENCY DEPT VISIT HI MDM: CPT | Mod: 25

## 2021-12-16 PROCEDURE — 80053 COMPREHEN METABOLIC PANEL: CPT | Performed by: GENERAL PRACTICE

## 2021-12-16 PROCEDURE — 83690 ASSAY OF LIPASE: CPT | Performed by: GENERAL PRACTICE

## 2021-12-16 PROCEDURE — 82272 OCCULT BLD FECES 1-3 TESTS: CPT | Performed by: GENERAL PRACTICE

## 2021-12-16 RX ORDER — ACETAMINOPHEN 500 MG
1000 TABLET ORAL
Status: COMPLETED | OUTPATIENT
Start: 2021-12-16 | End: 2021-12-17

## 2021-12-16 RX ORDER — TETRACAINE HYDROCHLORIDE 5 MG/ML
2 SOLUTION OPHTHALMIC
Status: COMPLETED | OUTPATIENT
Start: 2021-12-16 | End: 2021-12-16

## 2021-12-16 RX ADMIN — TETRACAINE HYDROCHLORIDE 2 DROP: 5 SOLUTION OPHTHALMIC at 11:12

## 2021-12-16 RX ADMIN — SODIUM CHLORIDE 1000 ML: 0.9 INJECTION, SOLUTION INTRAVENOUS at 11:12

## 2021-12-16 RX ADMIN — FLUORESCEIN SODIUM 1 EACH: 1 STRIP OPHTHALMIC at 11:12

## 2021-12-17 VITALS
HEART RATE: 64 BPM | SYSTOLIC BLOOD PRESSURE: 116 MMHG | TEMPERATURE: 98 F | WEIGHT: 245 LBS | DIASTOLIC BLOOD PRESSURE: 76 MMHG | RESPIRATION RATE: 18 BRPM | HEIGHT: 73 IN | BODY MASS INDEX: 32.47 KG/M2 | OXYGEN SATURATION: 97 %

## 2021-12-17 LAB
ALBUMIN SERPL BCP-MCNC: 4 G/DL (ref 3.5–5.2)
ALP SERPL-CCNC: 47 U/L (ref 55–135)
ALT SERPL W/O P-5'-P-CCNC: 19 U/L (ref 10–44)
ANION GAP SERPL CALC-SCNC: 12 MMOL/L (ref 8–16)
AST SERPL-CCNC: 21 U/L (ref 10–40)
BILIRUB SERPL-MCNC: 0.8 MG/DL (ref 0.1–1)
BUN SERPL-MCNC: 12 MG/DL (ref 6–20)
CALCIUM SERPL-MCNC: 8.8 MG/DL (ref 8.7–10.5)
CHLORIDE SERPL-SCNC: 100 MMOL/L (ref 95–110)
CO2 SERPL-SCNC: 26 MMOL/L (ref 23–29)
CREAT SERPL-MCNC: 1 MG/DL (ref 0.5–1.4)
EST. GFR  (AFRICAN AMERICAN): >60 ML/MIN/1.73 M^2
EST. GFR  (NON AFRICAN AMERICAN): >60 ML/MIN/1.73 M^2
GLUCOSE SERPL-MCNC: 98 MG/DL (ref 70–110)
LIPASE SERPL-CCNC: 30 U/L (ref 4–60)
POTASSIUM SERPL-SCNC: 3.5 MMOL/L (ref 3.5–5.1)
PROT SERPL-MCNC: 7.2 G/DL (ref 6–8.4)
SODIUM SERPL-SCNC: 138 MMOL/L (ref 136–145)

## 2021-12-17 PROCEDURE — 25000003 PHARM REV CODE 250: Performed by: GENERAL PRACTICE

## 2021-12-17 PROCEDURE — 25500020 PHARM REV CODE 255: Performed by: EMERGENCY MEDICINE

## 2021-12-17 RX ADMIN — ACETAMINOPHEN 1000 MG: 500 TABLET, FILM COATED ORAL at 01:12

## 2021-12-17 RX ADMIN — IOHEXOL 100 ML: 350 INJECTION, SOLUTION INTRAVENOUS at 12:12

## 2023-04-15 ENCOUNTER — HOSPITAL ENCOUNTER (EMERGENCY)
Facility: HOSPITAL | Age: 48
Discharge: HOME | End: 2023-04-15
Attending: EMERGENCY MEDICINE
Payer: MEDICAID

## 2023-04-15 VITALS
RESPIRATION RATE: 19 BRPM | DIASTOLIC BLOOD PRESSURE: 79 MMHG | SYSTOLIC BLOOD PRESSURE: 118 MMHG | BODY MASS INDEX: 32.6 KG/M2 | HEIGHT: 73 IN | TEMPERATURE: 98 F | OXYGEN SATURATION: 96 % | WEIGHT: 246 LBS | HEART RATE: 64 BPM

## 2023-04-15 DIAGNOSIS — I72.8 SPLENIC ARTERY ANEURYSM: ICD-10-CM

## 2023-04-15 DIAGNOSIS — Z71.1 CONCERN ABOUT PEPTIC ULCER DISEASE WITHOUT DIAGNOSIS: ICD-10-CM

## 2023-04-15 DIAGNOSIS — R07.9 CHEST PAIN: Primary | ICD-10-CM

## 2023-04-15 LAB
ALBUMIN SERPL BCP-MCNC: 3.9 G/DL (ref 3.5–5.2)
ALP SERPL-CCNC: 63 U/L (ref 55–135)
ALT SERPL W/O P-5'-P-CCNC: 29 U/L (ref 10–44)
ANION GAP SERPL CALC-SCNC: 9 MMOL/L (ref 8–16)
AST SERPL-CCNC: 19 U/L (ref 10–40)
BASOPHILS # BLD AUTO: 0.06 K/UL (ref 0–0.2)
BASOPHILS NFR BLD: 1.1 % (ref 0–1.9)
BILIRUB SERPL-MCNC: 0.7 MG/DL (ref 0.1–1)
BNP SERPL-MCNC: 29 PG/ML (ref 0–99)
BUN SERPL-MCNC: 16 MG/DL (ref 6–20)
CALCIUM SERPL-MCNC: 8.8 MG/DL (ref 8.7–10.5)
CHLORIDE SERPL-SCNC: 104 MMOL/L (ref 95–110)
CO2 SERPL-SCNC: 24 MMOL/L (ref 23–29)
CREAT SERPL-MCNC: 0.8 MG/DL (ref 0.5–1.4)
D DIMER PPP IA.FEU-MCNC: 0.8 MG/L FEU
DIFFERENTIAL METHOD: ABNORMAL
EOSINOPHIL # BLD AUTO: 0.4 K/UL (ref 0–0.5)
EOSINOPHIL NFR BLD: 7 % (ref 0–8)
ERYTHROCYTE [DISTWIDTH] IN BLOOD BY AUTOMATED COUNT: 13.4 % (ref 11.5–14.5)
EST. GFR  (NO RACE VARIABLE): >60 ML/MIN/1.73 M^2
GLUCOSE SERPL-MCNC: 104 MG/DL (ref 70–110)
HCT VFR BLD AUTO: 40.3 % (ref 40–54)
HGB BLD-MCNC: 13.8 G/DL (ref 14–18)
IMM GRANULOCYTES # BLD AUTO: 0 K/UL (ref 0–0.04)
IMM GRANULOCYTES NFR BLD AUTO: 0 % (ref 0–0.5)
LIPASE SERPL-CCNC: 26 U/L (ref 4–60)
LYMPHOCYTES # BLD AUTO: 2.2 K/UL (ref 1–4.8)
LYMPHOCYTES NFR BLD: 41.1 % (ref 18–48)
MAGNESIUM SERPL-MCNC: 2 MG/DL (ref 1.6–2.6)
MCH RBC QN AUTO: 28.5 PG (ref 27–31)
MCHC RBC AUTO-ENTMCNC: 34.2 G/DL (ref 32–36)
MCV RBC AUTO: 83 FL (ref 82–98)
MONOCYTES # BLD AUTO: 0.6 K/UL (ref 0.3–1)
MONOCYTES NFR BLD: 11.5 % (ref 4–15)
NEUTROPHILS # BLD AUTO: 2.1 K/UL (ref 1.8–7.7)
NEUTROPHILS NFR BLD: 39.3 % (ref 38–73)
NRBC BLD-RTO: 0 /100 WBC
PLATELET # BLD AUTO: 213 K/UL (ref 150–450)
PMV BLD AUTO: 9.3 FL (ref 9.2–12.9)
POTASSIUM SERPL-SCNC: 3.6 MMOL/L (ref 3.5–5.1)
PROT SERPL-MCNC: 6.9 G/DL (ref 6–8.4)
RBC # BLD AUTO: 4.84 M/UL (ref 4.6–6.2)
SODIUM SERPL-SCNC: 137 MMOL/L (ref 136–145)
TROPONIN I SERPL HS-MCNC: 2.5 PG/ML (ref 0–14.9)
TROPONIN I SERPL HS-MCNC: <2.3 PG/ML (ref 0–14.9)
WBC # BLD AUTO: 5.3 K/UL (ref 3.9–12.7)

## 2023-04-15 PROCEDURE — 80053 COMPREHEN METABOLIC PANEL: CPT | Performed by: EMERGENCY MEDICINE

## 2023-04-15 PROCEDURE — 25500020 PHARM REV CODE 255: Performed by: EMERGENCY MEDICINE

## 2023-04-15 PROCEDURE — 93010 EKG 12-LEAD: ICD-10-PCS | Mod: ,,, | Performed by: SPECIALIST

## 2023-04-15 PROCEDURE — 93005 ELECTROCARDIOGRAM TRACING: CPT | Performed by: SPECIALIST

## 2023-04-15 PROCEDURE — 25000003 PHARM REV CODE 250: Performed by: EMERGENCY MEDICINE

## 2023-04-15 PROCEDURE — 85025 COMPLETE CBC W/AUTO DIFF WBC: CPT | Performed by: EMERGENCY MEDICINE

## 2023-04-15 PROCEDURE — 93010 ELECTROCARDIOGRAM REPORT: CPT | Mod: ,,, | Performed by: SPECIALIST

## 2023-04-15 PROCEDURE — 83880 ASSAY OF NATRIURETIC PEPTIDE: CPT | Performed by: EMERGENCY MEDICINE

## 2023-04-15 PROCEDURE — 85379 FIBRIN DEGRADATION QUANT: CPT | Performed by: EMERGENCY MEDICINE

## 2023-04-15 PROCEDURE — 83735 ASSAY OF MAGNESIUM: CPT | Performed by: EMERGENCY MEDICINE

## 2023-04-15 PROCEDURE — 84484 ASSAY OF TROPONIN QUANT: CPT | Mod: 91 | Performed by: EMERGENCY MEDICINE

## 2023-04-15 PROCEDURE — 99285 EMERGENCY DEPT VISIT HI MDM: CPT | Mod: 25

## 2023-04-15 PROCEDURE — 83690 ASSAY OF LIPASE: CPT | Performed by: EMERGENCY MEDICINE

## 2023-04-15 RX ORDER — ASPIRIN 325 MG
325 TABLET ORAL
Status: COMPLETED | OUTPATIENT
Start: 2023-04-15 | End: 2023-04-15

## 2023-04-15 RX ORDER — PANTOPRAZOLE SODIUM 20 MG/1
20 TABLET, DELAYED RELEASE ORAL DAILY
Qty: 30 TABLET | Refills: 1 | Status: SHIPPED | OUTPATIENT
Start: 2023-04-15 | End: 2023-05-15

## 2023-04-15 RX ADMIN — ASPIRIN 325 MG: 325 TABLET ORAL at 02:04

## 2023-04-15 RX ADMIN — IOHEXOL 100 ML: 350 INJECTION, SOLUTION INTRAVENOUS at 04:04

## 2023-04-15 NOTE — ED PROVIDER NOTES
Encounter Date: 4/15/2023       History     Chief Complaint   Patient presents with    Chest Pain     47-year-old male with past medical history back surgery, presents emergency department with chest pain.  He says it has been present for the last 4 days.  He describes it as aching type pain in the left part of his chest.  It is nonradiating.  Nothing seems to make it better or worse.  He says that it was severe the 1st day that it started in kind of lasted all day long.  It is kind of come and gone and came back again today.  He says it is associated with some belching and thinks food might make it worse.  He says that he is not tried anything for pain.  It is nonexertional.  It is not associated with any diaphoresis.  Not associated with any nausea or vomiting.  No associated shortness of breath but it does hurt sometimes when he takes a deep breath.  No family history of PE or early acute coronary syndrome.  He himself has no known history of hypertension hyperlipidemia diabetes.  He is not a smoker.  He does not drink alcohol.    Review of patient's allergies indicates:  No Known Allergies  Past Medical History:   Diagnosis Date    Seizures     as a child     Past Surgical History:   Procedure Laterality Date    BACK SURGERY  2003    replaced disk in lumbar spine     No family history on file.  Social History     Tobacco Use    Smoking status: Former    Smokeless tobacco: Never   Substance Use Topics    Alcohol use: Never     Review of Systems   Constitutional:  Negative for chills and fever.   HENT:  Negative for sore throat.    Respiratory:  Negative for shortness of breath.    Cardiovascular:  Positive for chest pain. Negative for palpitations.   Gastrointestinal:  Negative for abdominal pain, nausea and vomiting.   Genitourinary:  Negative for dysuria.   Musculoskeletal:  Negative for back pain.   Skin:  Negative for rash.   Neurological:  Negative for weakness and headaches.   Hematological:  Does not  bruise/bleed easily.   All other systems reviewed and are negative.    Physical Exam     Initial Vitals [04/15/23 1356]   BP Pulse Resp Temp SpO2   (!) 154/91 72 18 98.3 °F (36.8 °C) 100 %      MAP       --         Physical Exam    Nursing note and vitals reviewed.  Constitutional: He appears well-developed and well-nourished. He is not diaphoretic. No distress.   HENT:   Head: Normocephalic and atraumatic.   Mouth/Throat: Oropharynx is clear and moist. No oropharyngeal exudate.   Eyes: Conjunctivae and EOM are normal. Pupils are equal, round, and reactive to light.   Neck: Neck supple. No tracheal deviation present.   Normal range of motion.  Cardiovascular:  Normal rate, regular rhythm, normal heart sounds and intact distal pulses.           No murmur heard.  Pulmonary/Chest: Breath sounds normal. No stridor. No respiratory distress. He has no wheezes. He has no rhonchi. He has no rales.   Abdominal: Abdomen is soft. Bowel sounds are normal. He exhibits no distension. There is no abdominal tenderness. There is no rebound and no guarding.   Musculoskeletal:         General: No tenderness or edema. Normal range of motion.      Cervical back: Normal range of motion and neck supple.     Neurological: He is alert and oriented to person, place, and time. He has normal strength. No cranial nerve deficit or sensory deficit. GCS score is 15. GCS eye subscore is 4. GCS verbal subscore is 5. GCS motor subscore is 6.   Skin: Skin is warm and dry. Capillary refill takes less than 2 seconds. No rash noted. No erythema. No pallor.   Psychiatric: He has a normal mood and affect. His behavior is normal. Thought content normal.       ED Course   Procedures  Labs Reviewed   CBC W/ AUTO DIFFERENTIAL - Abnormal; Notable for the following components:       Result Value    Hemoglobin 13.8 (*)     All other components within normal limits   D DIMER, QUANTITATIVE - Abnormal; Notable for the following components:    D-Dimer 0.80 (*)      All other components within normal limits    Narrative:     ddimr critical result(s) repeated. Called and verbal readback   obtained from Kellen Smith RN by LS1 04/15/2023 15:17   MAGNESIUM   COMPREHENSIVE METABOLIC PANEL   TROPONIN I HIGH SENSITIVITY   B-TYPE NATRIURETIC PEPTIDE   LIPASE   TROPONIN I HIGH SENSITIVITY   LIPASE        ECG Results              EKG 12-lead (In process)  Result time 04/15/23 15:54:37      In process by Interface, Lab In [facility] (04/15/23 15:54:37)                   Narrative:    Test Reason : R07.9,    Vent. Rate : 071 BPM     Atrial Rate : 071 BPM     P-R Int : 134 ms          QRS Dur : 096 ms      QT Int : 368 ms       P-R-T Axes : 050 031 033 degrees     QTc Int : 399 ms    Normal sinus rhythm  Normal ECG  When compared with ECG of 14-DEC-2021 19:58,  No significant change was found    Referred By: [email or web address]   SELF           Confirmed By:                                   Imaging Results              CTA Chest Non-Coronary (PE Studies) (Final result)  Result time 04/15/23 20:31:13      Final result by Francesca Mejia MD (04/15/23 20:31:13)                   Narrative:    EXAM:  CT Angiography Chest With Intravenous Contrast    CLINICAL HISTORY:  Pulmonary embolism (PE) suspected, positive D-dimer    TECHNIQUE:  Axial computed tomographic angiography images of the chest with intravenous contrast.  Sagittal and coronal reformatted images were created and reviewed.  This CT exam was performed using one or more of the following dose reduction techniques:  automated exposure control, adjustment of the mA and/or kV according to patient size, and/or use of iterative reconstruction technique.  MIP reconstructed images were created and reviewed.    COMPARISON:  No relevant prior studies available.    FINDINGS:  Pulmonary arteries:  No pulmonary embolism.  Aorta: Normal.  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 Approximately 1.6 cm in size. Arterial  enhancement is suboptimal.  Lungs: Patent airways. No acute infiltrate.  Pleural space:  No pleural effusion or pneumothorax.  Heart:  No cardiomegaly.  No significant pericardial effusion.  Bones/joints:  No acute fracture.  No dislocation.  Soft tissues: Normal.  Lymph nodes:  No enlarged lymph nodes.    IMPRESSION:  1.  No pulmonary embolism. No acute finding.  2.  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 Approximately 1.6 cm in size. One year follow-up CTA of the abdomen is recommended.    Electronically signed by:  Francesca Turcios MD  4/15/2023 8:31 PM CDT Workstation: [id number]                                     X-Ray Chest AP Portable (Final result)  Result time 04/15/23 14:50:20      Final result by Vick Camacho MD (04/15/23 14:50:20)                   Narrative:    HISTORY: Chest Pain    FINDINGS: Portable chest radiograph at 1436 hours compared to prior exams shows the cardiomediastinal silhouette and pulmonary vasculature are within normal limits.    The lungs are normally expanded, with no consolidation, large pleural effusion, or evidence of pulmonary edema. No confluent infiltrates or pneumothorax. No acute fractures or destructive osseous lesions.    IMPRESSION: No evidence of active cardiopulmonary disease.    Electronically signed by:  Vick Camacho MD  4/15/2023 2:50 PM CDT Workstation: [id number]                                     Medications   aspirin tablet 325 mg (325 mg Oral Given 4/15/23 1446)   iohexoL (OMNIPAQUE 350) injection 100 mL (100 mLs Intravenous Given 4/15/23 1613)     Medical Decision Making:   Clinical Tests:   Lab Tests: Ordered and Reviewed  Radiological Study: Ordered and Reviewed  Medical Tests: Ordered and Reviewed  ED Management:  Emergent evaluation 47-year-old male who presents emergency department with chest pain as described above.  He has been evaluated emergency department has had 2- troponins, 2 nonischemic EKGs, less suspicious for acute  coronary syndrome out of abundance of precaution I have ordered outpatient stress test for him as he is not had any recent stress testing.  This will be done on outpatient basis.  I considered PE but felt less likely, D-dimer unfortunately came back elevated, CTA of the chest was ordered did not show any evidence of PE, dissection, pneumonia pneumothorax or any other acute pathology.  Patient has low pretest probability for pericardial tamponade.  Patient BNP within normal limits, electrolytes within normal limits.  He has improved.  He was counseled on his incidental finding of splenic artery aneurysm now recommend he follow up with vascular for this.  In addition, will place him on Protonix as it could be GI related as he states he is had significant amount of belching some reflux type symptoms as well associated with this.  Recommend PCP follow-up as well.  If symptoms change or worsen he should return to the emergency department.    I had a detailed discussion with the patient and/or guardian regarding: The historical points, exam findings, and diagnostic results supporting the discharge diagnosis, lab results, pertinent radiology results, and the need for outpatient follow-up, for definitive care with a family practitioner, vascular surgery and to return to the emergency department if symptoms worsen or persist or if there are any questions or concerns that arise at home. All questions have been answered in detail. Strict return to Emergency Department precautions have been provided.    A dictation software program was used for this note.  Please expect some simple typographical  errors in this note.   Additional MDM:   Heart Score:    History:          Slightly suspicious.  ECG:             Normal  Age:               45-65 years  Risk factors: no risk factors known  Troponin:       Less than or equal to normal limit  Final Score: 1          ED Course as of 04/15/23 2108   Sat Apr 15, 2023   1423 EKG 2:18 p.m.  normal sinus rhythm rate of 71.  No ST elevation or depression.  No evidence of ischemia.  Normal intervals.  No STEMI.  EKG interpreted independently by me. [JR]   1908 EKG 6:25 p.m. sinus bradycardia rate of 59.  No ST elevation or depression.  No evidence of ischemia.  Normal intervals.  No STEMI.  EKG interpreted by me. [JR]   1911 Still waiting 3 hours later for patient's CTA of his chest to result [JR]      ED Course User Index  [JR] Matt Landry DO                 Clinical Impression:   Final diagnoses:  [R07.9] Chest pain (Primary)  [I72.8] Splenic artery aneurysm  [Z71.1] Concern about peptic ulcer disease without diagnosis        ED Disposition Condition    Discharge Stable          ED Prescriptions       Medication Sig Dispense Start Date End Date Auth. Provider    pantoprazole (PROTONIX) 20 MG tablet Take 1 tablet (20 mg total) by mouth once daily. 30 tablet 4/15/2023 5/15/2023 Matt Landry DO          Follow-up Information       Follow up With Specialties Details Why Contact Info Additional Information    Access [facility]  In 3 days  [address] [address]  [phone]       [facility] - Emergency Dept Emergency Medicine  If symptoms worsen [address] [address]  [phone] 1st floor    Kade Patino MD Interventional Cardiology, Cardiology, Cardiovascular Disease In 3 days  [address]  [address]  [facility] [address]  [phone]       Jt Chu MD Vascular Surgery, Cardiology In 1 week  [address]  [address]  [place] [address]  [phone]                Matt Landry DO  04/15/23 2108

## 2023-04-16 NOTE — DISCHARGE INSTRUCTIONS
RETURN TO EMERGENCY DEPARTMENT WITHOUT FAIL, IF YOUR SYMPTOMS WORSEN, IF YOU GET NEW OR DIFFERENT SYMPTOMS, IF YOU ARE UNABLE TO FOLLOW UP AS DIRECTED, OR IF YOU HAVE ANY CONCERNS OR WORRIES.    You need to follow-up peripheral vascular for your incidental splenic artery aneurysm.  Need to follow up with Cardiology for outpatient stress testing.  Take reflux medicine as directed.  Follow-up with primary care provider.

## 2023-04-17 NOTE — NURSING
Nurse Navigator Note: Spoke with patient regarding Outpatient stress test ordered. Patient scheduled for stress test for 4/19 (pt's request). [facility] to call and confirm appt / instructions with patient. Scheduling notified of appt date. ED- OP spreadsheet updated.

## 2023-04-18 ENCOUNTER — TELEPHONE (OUTPATIENT)
Dept: CARDIOLOGY | Facility: HOSPITAL | Age: 48
End: 2023-04-18

## 2023-06-13 ENCOUNTER — HOSPITAL ENCOUNTER (EMERGENCY)
Facility: HOSPITAL | Age: 48
Discharge: LEFT BEFORE DISCHARGE | End: 2023-06-13
Attending: EMERGENCY MEDICINE
Payer: MEDICAID

## 2023-06-13 VITALS
SYSTOLIC BLOOD PRESSURE: 108 MMHG | RESPIRATION RATE: 20 BRPM | WEIGHT: 246 LBS | BODY MASS INDEX: 32.6 KG/M2 | DIASTOLIC BLOOD PRESSURE: 72 MMHG | OXYGEN SATURATION: 98 % | HEIGHT: 73 IN | HEART RATE: 75 BPM | TEMPERATURE: 98 F

## 2023-06-13 DIAGNOSIS — R00.2 PALPITATIONS: ICD-10-CM

## 2023-06-13 DIAGNOSIS — R50.9 FEVER, UNSPECIFIED FEVER CAUSE: Primary | ICD-10-CM

## 2023-06-13 DIAGNOSIS — R07.9 CHEST PAIN: ICD-10-CM

## 2023-06-13 LAB
ALBUMIN SERPL BCP-MCNC: 3.6 G/DL (ref 3.5–5.2)
ALP SERPL-CCNC: 74 U/L (ref 55–135)
ALT SERPL W/O P-5'-P-CCNC: 32 U/L (ref 10–44)
ANION GAP SERPL CALC-SCNC: 10 MMOL/L (ref 8–16)
AST SERPL-CCNC: 25 U/L (ref 10–40)
BASOPHILS # BLD AUTO: 0.03 K/UL (ref 0–0.2)
BASOPHILS NFR BLD: 0.4 % (ref 0–1.9)
BILIRUB SERPL-MCNC: 0.9 MG/DL (ref 0.1–1)
BUN SERPL-MCNC: 12 MG/DL (ref 6–20)
CALCIUM SERPL-MCNC: 8.8 MG/DL (ref 8.7–10.5)
CHLORIDE SERPL-SCNC: 104 MMOL/L (ref 95–110)
CO2 SERPL-SCNC: 21 MMOL/L (ref 23–29)
CREAT SERPL-MCNC: 0.8 MG/DL (ref 0.5–1.4)
DIFFERENTIAL METHOD: ABNORMAL
EOSINOPHIL # BLD AUTO: 0.1 K/UL (ref 0–0.5)
EOSINOPHIL NFR BLD: 1.4 % (ref 0–8)
ERYTHROCYTE [DISTWIDTH] IN BLOOD BY AUTOMATED COUNT: 12.9 % (ref 11.5–14.5)
EST. GFR  (NO RACE VARIABLE): >60 ML/MIN/1.73 M^2
GLUCOSE SERPL-MCNC: 98 MG/DL (ref 70–110)
HCT VFR BLD AUTO: 38.1 % (ref 40–54)
HGB BLD-MCNC: 13.1 G/DL (ref 14–18)
IMM GRANULOCYTES # BLD AUTO: 0.02 K/UL (ref 0–0.04)
IMM GRANULOCYTES NFR BLD AUTO: 0.2 % (ref 0–0.5)
INFLUENZA A, MOLECULAR: NEGATIVE
INFLUENZA B, MOLECULAR: NEGATIVE
LACTATE SERPL-SCNC: 1 MMOL/L (ref 0.5–2.2)
LIPASE SERPL-CCNC: 7 U/L (ref 4–60)
LYMPHOCYTES # BLD AUTO: 1.8 K/UL (ref 1–4.8)
LYMPHOCYTES NFR BLD: 20.9 % (ref 18–48)
MCH RBC QN AUTO: 28.7 PG (ref 27–31)
MCHC RBC AUTO-ENTMCNC: 34.4 G/DL (ref 32–36)
MCV RBC AUTO: 84 FL (ref 82–98)
MONOCYTES # BLD AUTO: 1 K/UL (ref 0.3–1)
MONOCYTES NFR BLD: 11.4 % (ref 4–15)
NEUTROPHILS # BLD AUTO: 5.6 K/UL (ref 1.8–7.7)
NEUTROPHILS NFR BLD: 65.7 % (ref 38–73)
NRBC BLD-RTO: 0 /100 WBC
PLATELET # BLD AUTO: 250 K/UL (ref 150–450)
PMV BLD AUTO: 9.3 FL (ref 9.2–12.9)
POTASSIUM SERPL-SCNC: 3.8 MMOL/L (ref 3.5–5.1)
PROT SERPL-MCNC: 7.4 G/DL (ref 6–8.4)
RBC # BLD AUTO: 4.56 M/UL (ref 4.6–6.2)
SARS-COV-2 RDRP RESP QL NAA+PROBE: NEGATIVE
SODIUM SERPL-SCNC: 135 MMOL/L (ref 136–145)
SPECIMEN SOURCE: NORMAL
TROPONIN I SERPL DL<=0.01 NG/ML-MCNC: <0.006 NG/ML (ref 0–0.03)
WBC # BLD AUTO: 8.48 K/UL (ref 3.9–12.7)

## 2023-06-13 PROCEDURE — 80053 COMPREHEN METABOLIC PANEL: CPT | Performed by: EMERGENCY MEDICINE

## 2023-06-13 PROCEDURE — 93005 ELECTROCARDIOGRAM TRACING: CPT

## 2023-06-13 PROCEDURE — 87040 BLOOD CULTURE FOR BACTERIA: CPT | Performed by: EMERGENCY MEDICINE

## 2023-06-13 PROCEDURE — U0002 COVID-19 LAB TEST NON-CDC: HCPCS | Performed by: EMERGENCY MEDICINE

## 2023-06-13 PROCEDURE — 85025 COMPLETE CBC W/AUTO DIFF WBC: CPT | Performed by: EMERGENCY MEDICINE

## 2023-06-13 PROCEDURE — 99285 EMERGENCY DEPT VISIT HI MDM: CPT | Mod: 25

## 2023-06-13 PROCEDURE — 84484 ASSAY OF TROPONIN QUANT: CPT | Performed by: EMERGENCY MEDICINE

## 2023-06-13 PROCEDURE — 93010 ELECTROCARDIOGRAM REPORT: CPT | Mod: 76,59,, | Performed by: INTERNAL MEDICINE

## 2023-06-13 PROCEDURE — 83605 ASSAY OF LACTIC ACID: CPT | Performed by: EMERGENCY MEDICINE

## 2023-06-13 PROCEDURE — 93010 EKG 12-LEAD: ICD-10-PCS | Mod: ,,, | Performed by: INTERNAL MEDICINE

## 2023-06-13 PROCEDURE — 96360 HYDRATION IV INFUSION INIT: CPT

## 2023-06-13 PROCEDURE — 83690 ASSAY OF LIPASE: CPT | Performed by: EMERGENCY MEDICINE

## 2023-06-13 PROCEDURE — 93010 ELECTROCARDIOGRAM REPORT: CPT | Mod: ,,, | Performed by: INTERNAL MEDICINE

## 2023-06-13 PROCEDURE — 87502 INFLUENZA DNA AMP PROBE: CPT | Performed by: EMERGENCY MEDICINE

## 2023-06-13 PROCEDURE — 36415 COLL VENOUS BLD VENIPUNCTURE: CPT | Performed by: EMERGENCY MEDICINE

## 2023-06-13 PROCEDURE — 25000003 PHARM REV CODE 250: Performed by: EMERGENCY MEDICINE

## 2023-06-13 RX ORDER — IBUPROFEN 600 MG/1
600 TABLET ORAL
Status: COMPLETED | OUTPATIENT
Start: 2023-06-13 | End: 2023-06-13

## 2023-06-13 RX ORDER — ACETAMINOPHEN 500 MG
1000 TABLET ORAL
Status: COMPLETED | OUTPATIENT
Start: 2023-06-13 | End: 2023-06-13

## 2023-06-13 RX ADMIN — SODIUM CHLORIDE 1000 ML: 9 INJECTION, SOLUTION INTRAVENOUS at 03:06

## 2023-06-13 RX ADMIN — ACETAMINOPHEN 1000 MG: 500 TABLET ORAL at 02:06

## 2023-06-13 RX ADMIN — IBUPROFEN 600 MG: 600 TABLET ORAL at 02:06

## 2023-06-13 NOTE — ED PROVIDER NOTES
"Encounter Date: 6/13/2023    SCRIBE #1 NOTE: I, Claryemily Hilliard, am scribing for, and in the presence of,  Steven Nevarez MD.     History     Chief Complaint   Patient presents with    Chest Pain     Chest pain x 10days , belching, mid sternal pain, states he feels at times his heart beats fast and skips beats      Time seen by provider: 2:09 PM on 06/13/2023    Tommie Augustine is a 48 y.o. male with a PMHx of seizures who presents to the ED for multiple complaints.  He is complaining of occasional sternal chest discomfort.  Patient reports accompanying heart palpitations along with fatigue, sweating, chills, a sore throat, mild pain with swallowing, a runny nose that is worse in the a.m., sneezing, a nonproductive cough, SOB, frequent belching, a "burning" sensation to the chest region, rib pain bilaterally, and a HA.  No OTC medications were taken for management of Sx.  Review of external records performed:  Patient was evaluated for similar Sx on 04/15/2023, at which time he had a normal EKG, elevated D-dimer, abnormal CBC, as well as a negative Troponin and CTA chest non-coronary (PE studies).  He has not followed up with a cardiologist or primary care provider since this visit.  Currently, the patient denies leg swelling, abdominal pain, N/V, rashes, or any other symptoms at this time.  He does not smoke, use EtOH, or use illicit drugs, per patient.  PSHx includes back surgery.      The history is provided by the patient.   Review of patient's allergies indicates:  No Known Allergies  Past Medical History:   Diagnosis Date    Seizures     as a child     Past Surgical History:   Procedure Laterality Date    BACK SURGERY  2003    replaced disk in lumbar spine     No family history on file.  Social History     Tobacco Use    Smoking status: Former    Smokeless tobacco: Never   Substance Use Topics    Alcohol use: Never     Review of Systems   Constitutional:  Positive for chills, diaphoresis, fatigue and fever. "   HENT:  Positive for congestion, rhinorrhea, sneezing, sore throat and trouble swallowing.    Respiratory:  Positive for cough (nonproductive) and shortness of breath.         Positive for rib pain bilaterally.    Cardiovascular:  Positive for chest pain and palpitations. Negative for leg swelling.   Gastrointestinal:  Negative for abdominal pain, nausea and vomiting.        Positive for excessive belching and burning sensation to the chest.    Skin:  Negative for rash.   Neurological:  Positive for headaches.   All other systems reviewed and are negative.    Physical Exam     Initial Vitals [06/13/23 1343]   BP Pulse Resp Temp SpO2   127/76 98 20 (!) 100.5 °F (38.1 °C) 97 %      MAP       --         Physical Exam    Nursing note and vitals reviewed.  Constitutional: He appears well-developed and well-nourished. He is not diaphoretic.  Non-toxic appearance. He does not have a sickly appearance. He does not appear ill. No distress.   Sweaty on exam but otherwise very well-appearing   HENT:   Head: Normocephalic and atraumatic.   Eyes: EOM are normal.   Neck: Neck supple.   Normal range of motion.  Cardiovascular:  Normal rate, regular rhythm and normal heart sounds.     Exam reveals no gallop and no friction rub.       No murmur heard.  Pulmonary/Chest: Breath sounds normal. No respiratory distress. He has no wheezes. He has no rhonchi. He has no rales.   Abdominal: Abdomen is soft. He exhibits no distension. There is no abdominal tenderness. There is no rebound and no guarding.   Musculoskeletal:         General: Normal range of motion.      Cervical back: Normal range of motion and neck supple. No rigidity. Normal range of motion.     Neurological: He is alert and oriented to person, place, and time.   Skin: Skin is warm and dry. No rash noted.   Psychiatric: He has a normal mood and affect. His behavior is normal. Judgment and thought content normal.       ED Course   Procedures  Labs Reviewed   CBC W/ AUTO  DIFFERENTIAL - Abnormal; Notable for the following components:       Result Value    RBC 4.56 (*)     Hemoglobin 13.1 (*)     Hematocrit 38.1 (*)     All other components within normal limits   COMPREHENSIVE METABOLIC PANEL - Abnormal; Notable for the following components:    Sodium 135 (*)     CO2 21 (*)     All other components within normal limits   INFLUENZA A & B BY MOLECULAR   CULTURE, BLOOD   CULTURE, BLOOD   LACTIC ACID, PLASMA   TROPONIN I   LIPASE   SARS-COV-2 RNA AMPLIFICATION, QUAL   URINALYSIS, REFLEX TO URINE CULTURE   LACTIC ACID, PLASMA          Imaging Results              X-Ray Chest PA And Lateral (Final result)  Result time 06/13/23 15:11:08      Final result by Jt Obrien Jr., MD (06/13/23 15:11:08)                   Impression:      No acute abnormality.      Electronically signed by: Jt Obrien MD  Date:    06/13/2023  Time:    15:11               Narrative:    EXAMINATION:  XR CHEST PA AND LATERAL    CLINICAL HISTORY:  chest pain;    TECHNIQUE:  PA and lateral views of the chest were performed.    COMPARISON:  Chest portable of April 15, 2023    FINDINGS:  The lungs are clear, with normal appearance of pulmonary vasculature and no pleural effusion or pneumothorax.    The cardiac silhouette is normal in size. The hilar and mediastinal contours are unremarkable.    Bones are intact.                                       Medications   sodium chloride 0.9% bolus 1,000 mL 1,000 mL (has no administration in time range)   acetaminophen tablet 1,000 mg (1,000 mg Oral Given 6/13/23 1427)   ibuprofen tablet 600 mg (600 mg Oral Given 6/13/23 1427)   sodium chloride 0.9% bolus 1,000 mL 1,000 mL (0 mLs Intravenous Stopped 6/13/23 1634)     Medical Decision Making:   History:   Old Medical Records: I decided to obtain old medical records.  Initial Assessment:   Numerous complaints including chest pain, fatigue, palpitations, sweating, belching  Differential Diagnosis:   Pneumonia, viral  syndrome, acid reflux, mi  Independently Interpreted Test(s):   I have ordered and independently interpreted EKG Reading(s) - see prior notes  Clinical Tests:   Lab Tests: Ordered and Reviewed  Radiological Study: Ordered and Reviewed  Medical Tests: Ordered and Reviewed  ED Management:  Numerous labs were performed, EKG, IV fluids Motrin and Tylenol  48-year-old healthy male presents to the emergency room with numerous complaints listed above.  Extensive emergency room evaluation is unremarkable.  Fever has improved with Motrin and Tylenol.  Most likely diagnosis is viral syndrome but we are still pending a urinalysis.  Patient reports that he has to leave immediately and can not stay any longer.  He is going to leave against medical advice since the workup is not complete.  Initially has negative troponin.  I doubt myocardial infarction, blood cultures have been sent but I doubt sepsis.  Recently [place] for a pulmonary embolus which was negative, I see no reason to repeat a D-dimer or CT of the chest.  He is very well-appearing on discharge.  He is advised to return to the emergency room for any concerns or worsening symptoms.        Scribe Attestation:   Scribe #1: I performed the above scribed service and the documentation accurately describes the services I performed. I attest to the accuracy of the note.      ED Course as of 06/13/23 1749   Tue Jun 13, 2023   1400 BP: 127/76 [EF]   1400 Temp(!): 100.5 °F (38.1 °C) [EF]   1400 Temp Source: Oral [EF]   1400 Pulse: 98 [EF]   1400 Resp: 20 [EF]   1400 SpO2: 97 % [EF]   1415 Seen recently at [place] for palpitations, CTA of the chest was negative with a very marginally elevated D-dimer I see no reason to repeat this evaluation [EF]   1416 Sinus rhythm right axis 97 beats per minute no ST elevation or depression or T-wave inversion independently interpreted [EF]   1437 WBC: 8.48 [EF]   1437 Hemoglobin(!): 13.1 [EF]   1437 Platelets:  250 [EF]   1511 Lactate, Feng: 1.0 [EF]   1511 Lipase: 7 [EF]   1511 Sodium(!): 135 [EF]   1519 Troponin I: <0.006 [EF]   1519 X-Ray Chest PA And Lateral [EF]   1544 SARS-CoV-2 RNA, Amplification, Qual: Negative [EF]   1544 Influenza A, Molecular: Negative [EF]   1544 Influenza B, Molecular: Negative [EF]   1544 Temp: 98.3 °F (36.8 °C) [EF]   1626 Sinus rhythm 75 beats per minute normal axis ST elevation or depression no T-wave inversion independently interpreted [EF]   1717 Leaving AMA [EF]      ED Course User Index  [EF] Steven Nevarez MD               I, Dr. Nevarez, personally performed the services described in this documentation. All medical record entries made by the scribe were at my direction and in my presence.  I have reviewed the chart and agree that the record reflects my personal performance and is accurate and complete.5:50 PM 06/13/2023      Clinical Impression:   Final diagnoses:  [R07.9] Chest pain  [R00.2] Palpitations  [R50.9] Fever, unspecified fever cause (Primary)        ED Disposition Condition    AMA                Advised patient that they need admission and/or further workup.  Patient refuses.  Alert and oriented to person place and situation.  I explained the risks of worsening condition, death etc in layman's terms to the patient.  Patient voices these risks back to me.   Patient is not altered and is not under the influence.  Patient is welcome to return to the hospital at any time if they choose to do so.  I will Discharge the patient AGAINST MEDICAL ADVICE.       Steven Nevarez MD  06/13/23 4274

## 2023-06-13 NOTE — Clinical Note
"Date: 6/13/2023  Patient: Tommie Augustine  Admitted: 6/13/2023  2:00 PM  Attending Provider: Steven Nevarez MD    Tommie Augustine or his authorized caregiver has made the decision for the patient to leave the emergency department against the advice  of his attending physician. He or his authorized caregiver has been informed and understands the inherent risks, including death, worsening of condition, permanent disability.  He or his authorized caregiver has decided to accept the responsibility f or this decision. Tommie Augustine and all necessary parties have been advised that he may return for further evaluation or treatment. His condition at time of discharge was good.  Tommie Augustine had current vital signs as follows:  /72   Pulse  75   Temp 98.3 °F (36.8 °C) (Oral)   Resp 20   Ht 6' 1" (1.854 m)   Wt 111.6 kg (246 lb)   "

## 2023-06-13 NOTE — LETTER
Patient: Tommie Augustine  YOB: 1975  Date: 6/13/2023 Time: 5:15 PM  Location: [facility]    Leaving the Hospital Against Medical Advice    Chart #:[id number]    This will certify that I, the undersigned,    ______________________________________________________________________    A patient in the above named medical center, having requested discharge and removal from the medical center against the advice of my attending physician(s), hereby release [facility], its physicians, officers and employees, severally and individually, from any and all liability of any nature whatsoever for any injury or harm or complication of any kind that may result directly or indirectly, by reason of my terminating my stay as a patient at [facility] and my departure from [facility], and hereby waive any and all rights of action I may now have or later acquire as a result of my voluntary departure from [facility] and the termination of my stay as a patient therein.    This release is made with the full knowledge of the danger that may result from the action which I am taking.      Date:_______________________                         ___________________________                                                                                    Patient/Legal Representative    Witness:        ____________________________                          ___________________________  Nurse                                                                        Physician

## 2023-06-13 NOTE — ED NOTES
Pt reports midsternal chest pain x 10 days, shortness of breath, headache, cough, nasal congestion, chills and sore throat. . Pt denies nausea, vomiting, diarrhea.

## 2023-06-13 NOTE — ED NOTES
Pt unable to use bathroom, urinalysis cancelled by lab and reordered due to pt unable to urinate and specimen collected.

## 2023-06-15 ENCOUNTER — TELEPHONE (OUTPATIENT)
Dept: FAMILY MEDICINE | Facility: CLINIC | Age: 48
End: 2023-06-15
Payer: MEDICAID

## 2023-06-15 NOTE — TELEPHONE ENCOUNTER
Spoke w/ pt wife , advised that we are not taking any new patients with his insurance at this time . Pt wife says that they do have an appt sched w/ a provider on Wed . Advised pt he can return to ER if symptoms persist or he can be seen by urgent care .--lp

## 2023-06-15 NOTE — TELEPHONE ENCOUNTER
----- Message from Venita Rios sent at 6/15/2023 10:48 AM CDT -----  Type:  Sooner Appointment Request    Caller is requesting a sooner appointment.  Caller declined first available appointment listed below.  Caller will not accept being placed on the waitlist and is requesting a message be sent to doctor.    Name of Caller:  pt   When is the first available appointment?  N/a   Symptoms:  ER f/u , really bad chest pains / horrible fever . hospital cannot figure out what is wrong with pt   Best Call Back Number:  [phone]    Additional Information:  please advise \

## 2023-06-19 LAB
BACTERIA BLD CULT: NORMAL
BACTERIA BLD CULT: NORMAL

## 2023-06-22 DIAGNOSIS — M54.50 LOW BACK PAIN: Primary | ICD-10-CM

## 2023-09-17 ENCOUNTER — HOSPITAL ENCOUNTER (EMERGENCY)
Facility: HOSPITAL | Age: 48
Discharge: HOME | End: 2023-09-18
Attending: EMERGENCY MEDICINE
Payer: MEDICAID

## 2023-09-17 DIAGNOSIS — R07.9 CHEST PAIN: Primary | ICD-10-CM

## 2023-09-17 DIAGNOSIS — R07.89 CHEST WALL PAIN: ICD-10-CM

## 2023-09-17 PROCEDURE — 99285 EMERGENCY DEPT VISIT HI MDM: CPT | Mod: 25

## 2023-09-17 PROCEDURE — 83880 ASSAY OF NATRIURETIC PEPTIDE: CPT

## 2023-09-17 PROCEDURE — 93010 ELECTROCARDIOGRAM REPORT: CPT | Mod: ,,, | Performed by: SPECIALIST

## 2023-09-17 PROCEDURE — 93010 EKG 12-LEAD: ICD-10-PCS | Mod: ,,, | Performed by: SPECIALIST

## 2023-09-17 PROCEDURE — 85610 PROTHROMBIN TIME: CPT

## 2023-09-17 PROCEDURE — 93005 ELECTROCARDIOGRAM TRACING: CPT | Performed by: SPECIALIST

## 2023-09-17 PROCEDURE — 85025 COMPLETE CBC W/AUTO DIFF WBC: CPT

## 2023-09-18 VITALS
OXYGEN SATURATION: 97 % | WEIGHT: 240 LBS | DIASTOLIC BLOOD PRESSURE: 87 MMHG | TEMPERATURE: 98 F | HEART RATE: 78 BPM | BODY MASS INDEX: 31.66 KG/M2 | RESPIRATION RATE: 18 BRPM | SYSTOLIC BLOOD PRESSURE: 136 MMHG

## 2023-09-18 LAB
ALBUMIN SERPL BCP-MCNC: 3.9 G/DL (ref 3.5–5.2)
ALP SERPL-CCNC: 53 U/L (ref 55–135)
ALT SERPL W/O P-5'-P-CCNC: 17 U/L (ref 10–44)
ANION GAP SERPL CALC-SCNC: 5 MMOL/L (ref 8–16)
AST SERPL-CCNC: 14 U/L (ref 10–40)
BASOPHILS # BLD AUTO: 0.06 K/UL (ref 0–0.2)
BASOPHILS NFR BLD: 0.7 % (ref 0–1.9)
BILIRUB SERPL-MCNC: 0.5 MG/DL (ref 0.1–1)
BNP SERPL-MCNC: 11 PG/ML (ref 0–99)
BUN SERPL-MCNC: 9 MG/DL (ref 6–20)
CALCIUM SERPL-MCNC: 9.1 MG/DL (ref 8.7–10.5)
CHLORIDE SERPL-SCNC: 106 MMOL/L (ref 95–110)
CO2 SERPL-SCNC: 26 MMOL/L (ref 23–29)
CREAT SERPL-MCNC: 0.8 MG/DL (ref 0.5–1.4)
DIFFERENTIAL METHOD: ABNORMAL
EOSINOPHIL # BLD AUTO: 0.2 K/UL (ref 0–0.5)
EOSINOPHIL NFR BLD: 2.4 % (ref 0–8)
ERYTHROCYTE [DISTWIDTH] IN BLOOD BY AUTOMATED COUNT: 14.6 % (ref 11.5–14.5)
EST. GFR  (NO RACE VARIABLE): >60 ML/MIN/1.73 M^2
GLUCOSE SERPL-MCNC: 106 MG/DL (ref 70–110)
HCT VFR BLD AUTO: 45.5 % (ref 40–54)
HGB BLD-MCNC: 15.1 G/DL (ref 14–18)
IMM GRANULOCYTES # BLD AUTO: 0.02 K/UL (ref 0–0.04)
IMM GRANULOCYTES NFR BLD AUTO: 0.2 % (ref 0–0.5)
INR PPP: 1 (ref 0.8–1.2)
LYMPHOCYTES # BLD AUTO: 2.8 K/UL (ref 1–4.8)
LYMPHOCYTES NFR BLD: 30.1 % (ref 18–48)
MAGNESIUM SERPL-MCNC: 2.1 MG/DL (ref 1.6–2.6)
MCH RBC QN AUTO: 28.2 PG (ref 27–31)
MCHC RBC AUTO-ENTMCNC: 33.2 G/DL (ref 32–36)
MCV RBC AUTO: 85 FL (ref 82–98)
MONOCYTES # BLD AUTO: 0.7 K/UL (ref 0.3–1)
MONOCYTES NFR BLD: 7.4 % (ref 4–15)
NEUTROPHILS # BLD AUTO: 5.5 K/UL (ref 1.8–7.7)
NEUTROPHILS NFR BLD: 59.2 % (ref 38–73)
NRBC BLD-RTO: 0 /100 WBC
PLATELET # BLD AUTO: 277 K/UL (ref 150–450)
PMV BLD AUTO: 9.9 FL (ref 9.2–12.9)
POTASSIUM SERPL-SCNC: 3.8 MMOL/L (ref 3.5–5.1)
PROT SERPL-MCNC: 6.8 G/DL (ref 6–8.4)
PROTHROMBIN TIME: 10.7 SEC (ref 9–12.5)
RBC # BLD AUTO: 5.35 M/UL (ref 4.6–6.2)
SODIUM SERPL-SCNC: 137 MMOL/L (ref 136–145)
TROPONIN I SERPL HS-MCNC: 3.8 PG/ML (ref 0–14.9)
TROPONIN I SERPL HS-MCNC: 4 PG/ML (ref 0–14.9)
WBC # BLD AUTO: 9.23 K/UL (ref 3.9–12.7)

## 2023-09-18 PROCEDURE — 25000003 PHARM REV CODE 250

## 2023-09-18 PROCEDURE — 83735 ASSAY OF MAGNESIUM: CPT

## 2023-09-18 PROCEDURE — 84484 ASSAY OF TROPONIN QUANT: CPT

## 2023-09-18 PROCEDURE — 84484 ASSAY OF TROPONIN QUANT: CPT | Mod: 91

## 2023-09-18 PROCEDURE — 80053 COMPREHEN METABOLIC PANEL: CPT

## 2023-09-18 RX ORDER — ASPIRIN 325 MG
325 TABLET ORAL
Status: COMPLETED | OUTPATIENT
Start: 2023-09-18 | End: 2023-09-18

## 2023-09-18 RX ORDER — ACETAMINOPHEN 500 MG
1000 TABLET ORAL
Status: COMPLETED | OUTPATIENT
Start: 2023-09-18 | End: 2023-09-18

## 2023-09-18 RX ADMIN — ACETAMINOPHEN 1000 MG: 500 TABLET ORAL at 03:09

## 2023-09-18 RX ADMIN — ASPIRIN 325 MG ORAL TABLET 325 MG: 325 PILL ORAL at 12:09

## 2023-09-18 NOTE — ED PROVIDER NOTES
"Encounter Date: 9/17/2023       History     Chief Complaint   Patient presents with    Chest Pain     Pt complains of left sided chest pain that started an hour ago. Pt states it felt like my "heart was pounding". Pain radiates to left arm.      HPI    48-year-old male who is a tobacco smoker, presented to emergency department with chest pain radiating to left side and palpitations 2 hours prior to arrival.  Denies shortness of breath, diaphoresis, fever, chills nausea or vomiting at the time of symptoms.  Did feel nauseous prior to symptoms due to external stressors.  Patient was seen with similar symptoms 2 months ago in emergency department with a full workup and discharged home.  Was told to follow with the Cardiology which he did not yet.  Did not receive a stress test or any further cardiac workup.      Review of patient's allergies indicates:  No Known Allergies  Past Medical History:   Diagnosis Date    Seizures     as a child     Past Surgical History:   Procedure Laterality Date    BACK SURGERY  2003    replaced disk in lumbar spine     No family history on file.  Social History     Tobacco Use    Smoking status: Former    Smokeless tobacco: Never   Substance Use Topics    Alcohol use: Never     Review of Systems   Constitutional:  Negative for chills and fever.   HENT:  Negative for congestion and sore throat.    Respiratory:  Negative for cough and shortness of breath.    Cardiovascular:  Positive for chest pain and palpitations. Negative for leg swelling.   Gastrointestinal:  Negative for abdominal pain, nausea and vomiting.   Genitourinary:  Negative for dysuria.   Musculoskeletal:  Negative for back pain.   Skin:  Negative for rash.   Neurological:  Negative for weakness and light-headedness.   Hematological:  Does not bruise/bleed easily.       Physical Exam     Initial Vitals [09/17/23 2347]   BP Pulse Resp Temp SpO2   (!) 132/90 81 16 98.1 °F (36.7 °C) 95 %      MAP       --         Physical " Exam    Constitutional: He appears well-developed and well-nourished.   HENT:   Head: Normocephalic and atraumatic.   Eyes: Conjunctivae and EOM are normal. Pupils are equal, round, and reactive to light.   Neck: Neck supple.   Normal range of motion.  Cardiovascular:  Normal rate and regular rhythm.           Pulmonary/Chest: Breath sounds normal.   Reproducible chest pain.   Abdominal: Abdomen is soft.   Musculoskeletal:         General: Normal range of motion.      Cervical back: Normal range of motion and neck supple.     Neurological: He is alert and oriented to person, place, and time.   Skin: Skin is warm and dry. Capillary refill takes less than 2 seconds.   Psychiatric: He has a normal mood and affect.         ED Course   Procedures  Labs Reviewed   CBC W/ AUTO DIFFERENTIAL   COMPREHENSIVE METABOLIC PANEL   B-TYPE NATRIURETIC PEPTIDE   TROPONIN I HIGH SENSITIVITY   PROTIME-INR   MAGNESIUM          Imaging Results              X-Ray Chest AP Portable (In process)                      Medications - No data to display  Medical Decision Making  In brief, 48-year-old male with history of tobacco smoking, presented to emergency department with chest pain radiating to left side associated with palpitations that started 2 hours prior to arrival.    Vital stable, patient nontoxic appearing    Pertinent physical exam:  Reproducible chest pain    Differentials:  ACS,  musculoskeletal, among others    Orders:  Cardiac workup, EKG    Interventions:  Aspirin, Tylenol    Results:    Abnormal/pertinent labs:  Troponin negative x2.      Imaging:  Chest x-ray negative    EKG:  Normal sinus rhythm    Reassessment:  Hemodynamically stable    Dispo:  Discharged and given return precautions in the emergency department.  Heart score 3.  He will follow up with his primary care physician.    Amount and/or Complexity of Data Reviewed  External Data Reviewed: labs.  Labs: ordered.     Details: See narrative  Radiology: ordered and  independent interpretation performed.     Details: See narrative  ECG/medicine tests: ordered.     Details: See narrative    Risk  OTC drugs.      Nicole Goldberg MD  [employer] EM PGY3               ED Course as of 09/18/23 0327   Mon Sep 18, 2023   0249 EKG normal sinus rhythm []   0250 Heart score of 3 []      ED Course User Index  [] Nicole Goldberg MD                    Clinical Impression:   Final diagnoses:  [R07.9] Chest pain               Nicole Goldberg MD  Resident  09/18/23 0329       Nicole Goldberg MD  Resident  09/18/23 0330

## 2023-10-11 ENCOUNTER — OFFICE VISIT (OUTPATIENT)
Dept: URGENT CARE | Facility: CLINIC | Age: 48
End: 2023-10-11
Payer: MEDICAID

## 2023-10-11 VITALS
SYSTOLIC BLOOD PRESSURE: 120 MMHG | HEART RATE: 78 BPM | TEMPERATURE: 97 F | HEIGHT: 73 IN | WEIGHT: 232 LBS | RESPIRATION RATE: 16 BRPM | OXYGEN SATURATION: 98 % | DIASTOLIC BLOOD PRESSURE: 76 MMHG | BODY MASS INDEX: 30.75 KG/M2

## 2023-10-11 DIAGNOSIS — G56.02 CARPAL TUNNEL SYNDROME OF LEFT WRIST: Primary | ICD-10-CM

## 2023-10-11 DIAGNOSIS — M25.532 LEFT WRIST PAIN: ICD-10-CM

## 2023-10-11 PROCEDURE — 99204 PR OFFICE/OUTPT VISIT, NEW, LEVL IV, 45-59 MIN: ICD-10-PCS | Mod: S$GLB,,,

## 2023-10-11 PROCEDURE — 73110 XR WRIST COMPLETE 3 VIEWS LEFT: ICD-10-PCS | Mod: LT,S$GLB,, | Performed by: RADIOLOGY

## 2023-10-11 PROCEDURE — 73110 X-RAY EXAM OF WRIST: CPT | Mod: LT,S$GLB,, | Performed by: RADIOLOGY

## 2023-10-11 PROCEDURE — 99204 OFFICE O/P NEW MOD 45 MIN: CPT | Mod: S$GLB,,,

## 2023-10-11 RX ORDER — KETOROLAC TROMETHAMINE 30 MG/ML
15 INJECTION, SOLUTION INTRAMUSCULAR; INTRAVENOUS
Status: COMPLETED | OUTPATIENT
Start: 2023-10-11 | End: 2023-10-11

## 2023-10-11 RX ORDER — NAPROXEN 500 MG/1
500 TABLET ORAL 2 TIMES DAILY
Qty: 60 TABLET | Refills: 0 | Status: SHIPPED | OUTPATIENT
Start: 2023-10-11 | End: 2023-11-10

## 2023-10-11 RX ORDER — DEXAMETHASONE SODIUM PHOSPHATE 4 MG/ML
8 INJECTION, SOLUTION INTRA-ARTICULAR; INTRALESIONAL; INTRAMUSCULAR; INTRAVENOUS; SOFT TISSUE
Status: COMPLETED | OUTPATIENT
Start: 2023-10-11 | End: 2023-10-11

## 2023-10-11 RX ADMIN — KETOROLAC TROMETHAMINE 15 MG: 30 INJECTION, SOLUTION INTRAMUSCULAR; INTRAVENOUS at 01:10

## 2023-10-11 RX ADMIN — DEXAMETHASONE SODIUM PHOSPHATE 8 MG: 4 INJECTION, SOLUTION INTRA-ARTICULAR; INTRALESIONAL; INTRAMUSCULAR; INTRAVENOUS; SOFT TISSUE at 01:10

## 2023-10-11 NOTE — PATIENT INSTRUCTIONS
You should be receiving a call from Dr. Mccormack office to schedule an appointment.    Keep wrist brace on as tolerated while awake    As discussed please do not take any extra NSAIDs such as ibuprofen or Advil while using naproxen.

## 2023-10-11 NOTE — PROGRESS NOTES
"Subjective:      Patient ID: Tommie Augustine is a 48 y.o. male.    Vitals:  height is 6' 1" (1.854 m) and weight is 105.2 kg (232 lb). His oral temperature is 97.1 °F (36.2 °C). His blood pressure is 120/76 and his pulse is 78. His respiration is 16 and oxygen saturation is 98%.     Chief Complaint: Wrist Pain    Patient states he is had left wrist pain that has been worsening over the last 7 months.  States he is not seen a physician about this yet.  He has shooting pains randomly up his left arm from his wrist area.  During exam Phalen's sign was positive.  Patient also reports numbness and tingling and 1st 3 fingers intermittently.     Wrist Pain   Associated symptoms include numbness (Left thumb and 1st 2 fingers). Pertinent negatives include no fever.       Constitution: Negative for chills, fatigue and fever.   HENT: Negative.     Neck: neck negative.   Cardiovascular: Negative.    Respiratory: Negative.     Gastrointestinal: Negative.    Genitourinary: Negative.    Musculoskeletal:  Positive for joint pain (Left wrist). Negative for trauma and joint swelling.   Skin: Negative.  Negative for erythema.   Neurological:  Positive for numbness (Left thumb and 1st 2 fingers) and tingling.      Objective:     Physical Exam   Constitutional: He is oriented to person, place, and time. He appears well-developed.   HENT:   Head: Normocephalic and atraumatic. Head is without abrasion, without contusion and without laceration.   Ears:   Right Ear: External ear normal.   Left Ear: External ear normal.   Nose: Nose normal.   Mouth/Throat: Oropharynx is clear and moist and mucous membranes are normal.   Eyes: Conjunctivae, EOM and lids are normal. Pupils are equal, round, and reactive to light.   Neck: Trachea normal and phonation normal. Neck supple.   Cardiovascular: Normal rate, regular rhythm and normal heart sounds.   Pulmonary/Chest: Effort normal and breath sounds normal. No stridor. No respiratory distress. "   Abdominal: Normal appearance.   Musculoskeletal: Normal range of motion.         General: Tenderness present. Normal range of motion.   Neurological: He is alert and oriented to person, place, and time.   Skin: Skin is warm, dry, intact and no rash. Capillary refill takes less than 2 seconds. No abrasion, No burn, No bruising, No erythema and No ecchymosis   Psychiatric: His speech is normal and behavior is normal. Mood, judgment and thought content normal.   Nursing note and vitals reviewed.      Assessment:     1. Carpal tunnel syndrome of left wrist    2. Left wrist pain        Plan:       Carpal tunnel syndrome of left wrist  -     dexAMETHasone injection 8 mg  -     ketorolac injection 15 mg  -     Ambulatory referral/consult to Orthopedics  -     naproxen (NAPROSYN) 500 MG tablet; Take 1 tablet (500 mg total) by mouth 2 (two) times daily.  Dispense: 60 tablet; Refill: 0    Left wrist pain  -     dexAMETHasone injection 8 mg  -     ketorolac injection 15 mg  -     Ambulatory referral/consult to Orthopedics  -     XR WRIST COMPLETE 3 VIEWS LEFT; Future; Expected date: 10/11/2023  -     naproxen (NAPROSYN) 500 MG tablet; Take 1 tablet (500 mg total) by mouth 2 (two) times daily.  Dispense: 60 tablet; Refill: 0      Follow up with Dr. Mccormack office.  You should be receiving a phone call to schedule an appointment.    Discussed risks versus benefits of steroid injection.  Patient acknowledges understanding.    Discussed NSAID use with patient who acknowledges understanding

## 2023-10-11 NOTE — PROGRESS NOTES
"Subjective:      Patient ID: Tommie Augustine is a 48 y.o. male.    Vitals:  height is 6' 1" (1.854 m) and weight is 105.2 kg (232 lb). His oral temperature is 97.1 °F (36.2 °C). His blood pressure is 120/76 and his pulse is 78. His respiration is 16 and oxygen saturation is 98%.     Chief Complaint: Wrist Pain    Wrist Pain   The pain is present in the left wrist and left arm. This is a new problem. Episode onset: 7/8 months ago. There has been no history of extremity trauma. The problem occurs intermittently. The problem has been gradually worsening. The quality of the pain is described as aching and sharp (Shooting). Associated symptoms include an inability to bear weight, a limited range of motion, numbness and tingling. Treatments tried: Gabapentin. The treatment provided no relief.       Musculoskeletal:  Positive for abnormal ROM of joint.   Neurological:  Positive for numbness.      Objective:     Physical Exam    Assessment:     No diagnosis found.    Plan:       There are no diagnoses linked to this encounter.                "

## 2024-03-01 ENCOUNTER — TELEPHONE (OUTPATIENT)
Dept: FAMILY MEDICINE | Facility: CLINIC | Age: 49
End: 2024-03-01

## 2024-03-01 DIAGNOSIS — Z00.00 ANNUAL PHYSICAL EXAM: Primary | ICD-10-CM

## 2024-03-01 NOTE — TELEPHONE ENCOUNTER
Pt has an est care appt on 9/5, wife would like to know if he should have labs prior to his visit.  Please advise.

## 2024-04-05 ENCOUNTER — HOSPITAL ENCOUNTER (EMERGENCY)
Facility: HOSPITAL | Age: 49
Discharge: HOME | End: 2024-04-05
Attending: EMERGENCY MEDICINE
Payer: MEDICAID

## 2024-04-05 VITALS
TEMPERATURE: 98 F | DIASTOLIC BLOOD PRESSURE: 76 MMHG | HEART RATE: 62 BPM | SYSTOLIC BLOOD PRESSURE: 126 MMHG | BODY MASS INDEX: 30.21 KG/M2 | OXYGEN SATURATION: 100 % | WEIGHT: 229 LBS | RESPIRATION RATE: 16 BRPM

## 2024-04-05 DIAGNOSIS — S69.91XA FISH HOOK INJURY OF FINGER OF RIGHT HAND, INITIAL ENCOUNTER: Primary | ICD-10-CM

## 2024-04-05 PROCEDURE — 90471 IMMUNIZATION ADMIN: CPT | Performed by: EMERGENCY MEDICINE

## 2024-04-05 PROCEDURE — 90715 TDAP VACCINE 7 YRS/> IM: CPT | Performed by: EMERGENCY MEDICINE

## 2024-04-05 PROCEDURE — 63600175 PHARM REV CODE 636 W HCPCS: Performed by: EMERGENCY MEDICINE

## 2024-04-05 PROCEDURE — 10120 INC&RMVL FB SUBQ TISS SMPL: CPT | Mod: RT

## 2024-04-05 PROCEDURE — 99284 EMERGENCY DEPT VISIT MOD MDM: CPT | Mod: 25

## 2024-04-05 PROCEDURE — 25000003 PHARM REV CODE 250: Performed by: NURSE PRACTITIONER

## 2024-04-05 RX ORDER — ACETAMINOPHEN 325 MG/1
650 TABLET ORAL
Status: COMPLETED | OUTPATIENT
Start: 2024-04-05 | End: 2024-04-05

## 2024-04-05 RX ORDER — LIDOCAINE HYDROCHLORIDE 10 MG/ML
10 INJECTION INFILTRATION; PERINEURAL
Status: COMPLETED | OUTPATIENT
Start: 2024-04-05 | End: 2024-04-05

## 2024-04-05 RX ORDER — DOXYCYCLINE 100 MG/1
100 CAPSULE ORAL 2 TIMES DAILY
Qty: 20 CAPSULE | Refills: 0 | Status: SHIPPED | OUTPATIENT
Start: 2024-04-05 | End: 2024-04-15

## 2024-04-05 RX ORDER — LEVOFLOXACIN 750 MG/1
750 TABLET ORAL DAILY
Qty: 5 TABLET | Refills: 0 | Status: SHIPPED | OUTPATIENT
Start: 2024-04-05 | End: 2024-04-10

## 2024-04-05 RX ORDER — CEPHALEXIN 500 MG/1
500 CAPSULE ORAL 4 TIMES DAILY
Qty: 20 CAPSULE | Refills: 0 | Status: SHIPPED | OUTPATIENT
Start: 2024-04-05 | End: 2024-04-10

## 2024-04-05 RX ADMIN — LIDOCAINE HYDROCHLORIDE 10 ML: 10 INJECTION, SOLUTION INFILTRATION; PERINEURAL at 06:04

## 2024-04-05 RX ADMIN — TETANUS TOXOID, REDUCED DIPHTHERIA TOXOID AND ACELLULAR PERTUSSIS VACCINE, ADSORBED 0.5 ML: 5; 2.5; 8; 8; 2.5 SUSPENSION INTRAMUSCULAR at 07:04

## 2024-04-05 RX ADMIN — ACETAMINOPHEN 650 MG: 325 TABLET ORAL at 06:04

## 2024-04-05 NOTE — FIRST PROVIDER EVALUATION
Emergency Department TeleTriage Encounter Note      CHIEF COMPLAINT    Chief Complaint   Patient presents with    Foreign Body in Skin     Pt has fishing hook in right pinky finger.        VITAL SIGNS   Initial Vitals [04/05/24 1720]   BP Pulse Resp Temp SpO2   126/76 62 16 98.1 °F (36.7 °C) 100 %      MAP       --            ALLERGIES    Review of patient's allergies indicates:  No Known Allergies    PROVIDER TRIAGE NOTE  This is a teletriage evaluation of a 48 y.o. male presenting to the ED with c/o fish hook in right 5th digit. Hook has been used. Limited physical exam via telehealth: The patient is awake, alert, answering questions appropriately and is not in respiratory distress. Initial orders will be placed and care will be transferred to an alternate provider when patient is roomed for a full evaluation. Any additional orders and the final disposition will be determined by that provider.         ORDERS  Labs Reviewed - No data to display    ED Orders (720h ago, onward)      Start Ordered     Status Ordering Provider    04/06/24 0600 04/05/24 1730  Wound care routine - Clean wound  Daily        Comments: Clean Wound    Ordered JOANNE MARTÍNEZ    04/06/24 0600 04/05/24 1730  Wound care routine - Irrigate wound  Daily        Comments: Irrigate Wound    Ordered JOANNE MARTÍNEZ    04/05/24 1745 04/05/24 1730  LIDOcaine HCL 10 mg/ml (1%) injection 10 mL  ED 1 Time         Ordered JOANNE MARTÍNEZ    04/05/24 1745 04/05/24 1730  acetaminophen tablet 650 mg  ED 1 Time         Ordered JOANNE MARTÍNEZ    04/05/24 1730 04/05/24 1730  X-Ray Finger 2 or More Views Right  1 time imaging         Ordered JOANNE MARTÍNEZ              Virtual Visit Note: The provider triage portion of this emergency department evaluation and documentation was performed via [employer], a HIPAA-compliant telemedicine application, in concert with a tele-presenter in the room. A face to face patient evaluation with one of my colleagues  will occur once the patient is placed in an emergency department room.      DISCLAIMER: This note was prepared with [employer] voice recognition transcription software. Garbled syntax, mangled pronouns, and other bizarre constructions may be attributed to that software system.

## 2024-04-06 NOTE — ED PROVIDER NOTES
Encounter Date: 4/5/2024       History     Chief Complaint   Patient presents with    Foreign Body in Skin     Pt has fishing hook in right pinky finger.      HPI patient is a 48-year-old man who presents emergency department for evaluation of a fish hook impaled in his right 5th distal finger.  Patient is a  and was fishing at the time of the incident.  It was exposed to salt water.  Tetanus is not up-to-date.  Injury occurred in the past few hours.  Review of patient's allergies indicates:  No Known Allergies  Past Medical History:   Diagnosis Date    Seizures     as a child     Past Surgical History:   Procedure Laterality Date    BACK SURGERY  2003    replaced disk in lumbar spine     No family history on file.  Social History     Tobacco Use    Smoking status: Former    Smokeless tobacco: Never   Substance Use Topics    Alcohol use: Never     Review of Systems   Constitutional:  Negative for fever.   HENT:  Negative for sore throat.    Respiratory:  Negative for shortness of breath.    Cardiovascular:  Negative for chest pain.   Gastrointestinal:  Negative for nausea.   Genitourinary:  Negative for dysuria.   Musculoskeletal:  Negative for back pain.   Skin:  Positive for wound. Negative for rash.   Neurological:  Negative for weakness.   Hematological:  Does not bruise/bleed easily.       Physical Exam     Initial Vitals [04/05/24 1720]   BP Pulse Resp Temp SpO2   126/76 62 16 98.1 °F (36.7 °C) 100 %      MAP       --         Physical Exam    Nursing note and vitals reviewed.  Constitutional: He appears well-developed and well-nourished.   HENT:   Head: Normocephalic and atraumatic.   Eyes: EOM are normal. Pupils are equal, round, and reactive to light.   Neck: Neck supple.   Pulmonary/Chest: No respiratory distress.   Musculoskeletal:         General: Tenderness present. Normal range of motion.      Cervical back: Neck supple.      Comments: Fish hook in volar surface of the right distal  portion 5th digit.     Neurological: He is alert and oriented to person, place, and time.   Skin: Skin is warm and dry.         ED Course   Foreign Body    Date/Time: 4/5/2024 5:08 PM    Performed by: Graeme Tee MD  Authorized by: Graeme Tee MD  Body area: skin  General location: upper extremity  Location details: right small finger  Anesthesia: local infiltration    Anesthesia:  Local Anesthetic: lidocaine 1% without epinephrine    Patient sedated: no  Patient restrained: no  Localization method: visualized  Removal mechanism: scalpel and forceps  Dressing: dressing applied  Tendon involvement: none  Depth: subcutaneous  Complexity: simple  Post-procedure assessment: foreign body removed  Patient tolerance: Patient tolerated the procedure well with no immediate complications      Labs Reviewed - No data to display       Imaging Results              X-Ray Finger 2 or More Views Right (In process)                      Medications   LIDOcaine HCL 10 mg/ml (1%) injection 10 mL (10 mLs Infiltration Given 4/5/24 1815)   acetaminophen tablet 650 mg (650 mg Oral Given 4/5/24 1815)   Tdap (BOOSTRIX) vaccine injection 0.5 mL (0.5 mLs Intramuscular Given 4/5/24 1924)     Medical Decision Making  48-year-old man who presents emergency department for evaluation of a fish hook impaled in his right 5th distal finger.  Tetanus was updated in the ED. fish hook was successfully removed and thoroughly irrigated.  Patient warned of the risk of infection he will be placed on prophylactic antibiotic therapy to cover salt water organisms to include levofloxacin, Keflex and doxycycline.  Return precautions discussed.  Discharged in no acute distress.    Risk  Prescription drug management.                                      Clinical Impression:  Final diagnoses:  [S69.91XA] Fish hook injury of finger of right hand, initial encounter (Primary)          ED Disposition Condition    Discharge Stable          ED  Prescriptions       Medication Sig Dispense Start Date End Date Auth. Provider    levoFLOXacin (LEVAQUIN) 750 MG tablet Take 1 tablet (750 mg total) by mouth once daily. for 5 days 5 tablet 4/5/2024 4/10/2024 Graeme Tee MD    cephALEXin (KEFLEX) 500 MG capsule Take 1 capsule (500 mg total) by mouth 4 (four) times daily. for 5 days 20 capsule 4/5/2024 4/10/2024 Graeme Tee MD    doxycycline (VIBRAMYCIN) 100 MG Cap Take 1 capsule (100 mg total) by mouth 2 (two) times daily. for 10 days 20 capsule 4/5/2024 4/15/2024 Graeme Tee MD          Follow-up Information       Follow up With Specialties Details Why Contact Info Additional Information    [facility]  As needed, If symptoms worsen [address] [place] [address] Dr [place]ar [phone] 1st floor    [facility]   As needed [address] [place] [address]  [place] [address]  [phone]                Graeme Tee MD  04/05/24 1942

## 2024-09-05 ENCOUNTER — OFFICE VISIT (OUTPATIENT)
Dept: FAMILY MEDICINE | Facility: CLINIC | Age: 49
End: 2024-09-05
Payer: MEDICAID

## 2024-09-05 VITALS
SYSTOLIC BLOOD PRESSURE: 100 MMHG | HEIGHT: 73 IN | OXYGEN SATURATION: 94 % | TEMPERATURE: 98 F | HEART RATE: 65 BPM | WEIGHT: 240.31 LBS | DIASTOLIC BLOOD PRESSURE: 70 MMHG | RESPIRATION RATE: 18 BRPM | BODY MASS INDEX: 31.85 KG/M2

## 2024-09-05 DIAGNOSIS — I72.8 SPLENIC ARTERY ANEURYSM: ICD-10-CM

## 2024-09-05 DIAGNOSIS — G89.29 CHRONIC MIDLINE LOW BACK PAIN WITHOUT SCIATICA: ICD-10-CM

## 2024-09-05 DIAGNOSIS — Z23 NEED FOR SHINGLES VACCINE: ICD-10-CM

## 2024-09-05 DIAGNOSIS — B35.6 TINEA CRURIS: ICD-10-CM

## 2024-09-05 DIAGNOSIS — G56.03 BILATERAL CARPAL TUNNEL SYNDROME: ICD-10-CM

## 2024-09-05 DIAGNOSIS — K21.9 GASTROESOPHAGEAL REFLUX DISEASE WITHOUT ESOPHAGITIS: ICD-10-CM

## 2024-09-05 DIAGNOSIS — I71.21 ANEURYSM OF ASCENDING AORTA WITHOUT RUPTURE: ICD-10-CM

## 2024-09-05 DIAGNOSIS — Z12.11 SCREEN FOR COLON CANCER: ICD-10-CM

## 2024-09-05 DIAGNOSIS — M54.50 CHRONIC MIDLINE LOW BACK PAIN WITHOUT SCIATICA: ICD-10-CM

## 2024-09-05 DIAGNOSIS — Z00.00 WELL ADULT EXAM: Primary | ICD-10-CM

## 2024-09-05 LAB
ALBUMIN SERPL BCP-MCNC: 4.1 G/DL (ref 3.5–5.2)
ALP SERPL-CCNC: 59 U/L (ref 55–135)
ALT SERPL W/O P-5'-P-CCNC: 16 U/L (ref 10–44)
ANION GAP SERPL CALC-SCNC: 8 MMOL/L (ref 8–16)
AST SERPL-CCNC: 16 U/L (ref 10–40)
BASOPHILS # BLD AUTO: 0.04 K/UL (ref 0–0.2)
BASOPHILS NFR BLD: 0.7 % (ref 0–1.9)
BILIRUB SERPL-MCNC: 0.6 MG/DL (ref 0.1–1)
BUN SERPL-MCNC: 14 MG/DL (ref 6–20)
CALCIUM SERPL-MCNC: 9.3 MG/DL (ref 8.7–10.5)
CHLORIDE SERPL-SCNC: 108 MMOL/L (ref 95–110)
CHOLEST SERPL-MCNC: 193 MG/DL (ref 120–199)
CHOLEST/HDLC SERPL: 4.3 {RATIO} (ref 2–5)
CO2 SERPL-SCNC: 22 MMOL/L (ref 23–29)
COMPLEXED PSA SERPL-MCNC: 1.6 NG/ML (ref 0–4)
CREAT SERPL-MCNC: 0.8 MG/DL (ref 0.5–1.4)
DIFFERENTIAL METHOD BLD: NORMAL
EOSINOPHIL # BLD AUTO: 0.1 K/UL (ref 0–0.5)
EOSINOPHIL NFR BLD: 2.4 % (ref 0–8)
ERYTHROCYTE [DISTWIDTH] IN BLOOD BY AUTOMATED COUNT: 13.2 % (ref 11.5–14.5)
EST. GFR  (NO RACE VARIABLE): >60 ML/MIN/1.73 M^2
ESTIMATED AVG GLUCOSE: 105 MG/DL (ref 68–131)
GLUCOSE SERPL-MCNC: 92 MG/DL (ref 70–110)
HBA1C MFR BLD: 5.3 % (ref 4–5.6)
HCT VFR BLD AUTO: 43.4 % (ref 40–54)
HCV AB SERPL QL IA: NORMAL
HDLC SERPL-MCNC: 45 MG/DL (ref 40–75)
HDLC SERPL: 23.3 % (ref 20–50)
HGB BLD-MCNC: 14.6 G/DL (ref 14–18)
HIV 1+2 AB+HIV1 P24 AG SERPL QL IA: NORMAL
IMM GRANULOCYTES # BLD AUTO: 0.01 K/UL (ref 0–0.04)
IMM GRANULOCYTES NFR BLD AUTO: 0.2 % (ref 0–0.5)
LDLC SERPL CALC-MCNC: 132.4 MG/DL (ref 63–159)
LYMPHOCYTES # BLD AUTO: 2 K/UL (ref 1–4.8)
LYMPHOCYTES NFR BLD: 32.8 % (ref 18–48)
MCH RBC QN AUTO: 29.1 PG (ref 27–31)
MCHC RBC AUTO-ENTMCNC: 33.6 G/DL (ref 32–36)
MCV RBC AUTO: 87 FL (ref 82–98)
MONOCYTES # BLD AUTO: 0.5 K/UL (ref 0.3–1)
MONOCYTES NFR BLD: 8.1 % (ref 4–15)
NEUTROPHILS # BLD AUTO: 3.3 K/UL (ref 1.8–7.7)
NEUTROPHILS NFR BLD: 55.8 % (ref 38–73)
NONHDLC SERPL-MCNC: 148 MG/DL
NRBC BLD-RTO: 0 /100 WBC
PLATELET # BLD AUTO: 221 K/UL (ref 150–450)
PMV BLD AUTO: 10.3 FL (ref 9.2–12.9)
POTASSIUM SERPL-SCNC: 4.3 MMOL/L (ref 3.5–5.1)
PROT SERPL-MCNC: 6.9 G/DL (ref 6–8.4)
RBC # BLD AUTO: 5.01 M/UL (ref 4.6–6.2)
SODIUM SERPL-SCNC: 138 MMOL/L (ref 136–145)
TRIGL SERPL-MCNC: 78 MG/DL (ref 30–150)
TSH SERPL DL<=0.005 MIU/L-ACNC: 0.76 UIU/ML (ref 0.4–4)
WBC # BLD AUTO: 5.95 K/UL (ref 3.9–12.7)

## 2024-09-05 PROCEDURE — 85025 COMPLETE CBC W/AUTO DIFF WBC: CPT | Performed by: INTERNAL MEDICINE

## 2024-09-05 PROCEDURE — 87389 HIV-1 AG W/HIV-1&-2 AB AG IA: CPT | Performed by: INTERNAL MEDICINE

## 2024-09-05 PROCEDURE — 84443 ASSAY THYROID STIM HORMONE: CPT | Performed by: INTERNAL MEDICINE

## 2024-09-05 PROCEDURE — 84153 ASSAY OF PSA TOTAL: CPT | Performed by: INTERNAL MEDICINE

## 2024-09-05 PROCEDURE — 80053 COMPREHEN METABOLIC PANEL: CPT | Performed by: INTERNAL MEDICINE

## 2024-09-05 PROCEDURE — 80061 LIPID PANEL: CPT | Performed by: INTERNAL MEDICINE

## 2024-09-05 PROCEDURE — 86803 HEPATITIS C AB TEST: CPT | Performed by: INTERNAL MEDICINE

## 2024-09-05 PROCEDURE — 83036 HEMOGLOBIN GLYCOSYLATED A1C: CPT | Performed by: INTERNAL MEDICINE

## 2024-09-05 RX ORDER — GABAPENTIN 300 MG/1
300 CAPSULE ORAL NIGHTLY
Qty: 90 CAPSULE | Refills: 3 | Status: SHIPPED | OUTPATIENT
Start: 2024-09-05 | End: 2025-09-05

## 2024-09-05 RX ORDER — PRENATAL VIT 91/IRON/FOLIC/DHA 28-975-200
COMBINATION PACKAGE (EA) ORAL 2 TIMES DAILY
Qty: 30 G | Refills: 6 | Status: SHIPPED | OUTPATIENT
Start: 2024-09-05

## 2024-09-05 RX ORDER — PANTOPRAZOLE SODIUM 20 MG/1
20 TABLET, DELAYED RELEASE ORAL DAILY
Qty: 90 TABLET | Refills: 3 | Status: SHIPPED | OUTPATIENT
Start: 2024-09-05 | End: 2025-08-31

## 2024-09-05 NOTE — PROGRESS NOTES
"Subjective:       Patient ID: Tommie Augustine is a 49 y.o. male.    Medication List with Changes/Refills   New Medications    TERBINAFINE HCL (ANTIFUNGAL, TERBINAFINE,) 1 % CREAM    Apply topically 2 (two) times daily.   Changed and/or Refilled Medications    Modified Medication Previous Medication    GABAPENTIN (NEURONTIN) 300 MG CAPSULE gabapentin (NEURONTIN) 300 MG capsule       Take 1 capsule (300 mg total) by mouth every evening.    Take 2 capsules (600 mg total) by mouth 3 (three) times daily.    PANTOPRAZOLE (PROTONIX) 20 MG TABLET pantoprazole (PROTONIX) 20 MG tablet       Take 1 tablet (20 mg total) by mouth once daily.    Take 1 tablet (20 mg total) by mouth once daily.   Discontinued Medications    CYCLOBENZAPRINE (FLEXERIL) 5 MG TABLET    Take 5 mg by mouth.    HYDROCODONE-ACETAMINOPHEN (NORCO) 5-325 MG PER TABLET    Take 1 tablet by mouth every 8 (eight) hours as needed for Pain.    IBUPROFEN (ADVIL,MOTRIN) 200 MG TABLET    Take 800 mg by mouth as needed for Pain.    METHOCARBAMOL (ROBAXIN) 500 MG TAB    Take 500 mg by mouth.    VALACYCLOVIR (VALTREX) 1000 MG TABLET    Take 1 tablet (1,000 mg total) by mouth 3 (three) times daily. for 14 days       Chief Complaint: Follow-up  He is a new patient here today to establish care.     He had an incidental finding on CTA of the head and neck on 12/2021 with a ascending thoracic aorta ectasia. He has not had this followed up.     He has a splenic artery aneurysm seen as an incidental finding on CTA on 4/2023 that showed 1.6 cm aneurysm. Advised by radiology to repeat in one year.     He has chronic GERD and uses pantoprazole 20 mg daily. He feels this helps with his symptoms. He has not had an EGD.     He has a history of childhood grand mal seizures but is unsure of the official diagnosis. He was on treatment for many years and this just resolved at age 15 when "I started going to Anabaptism". He has not had a seizure since and does not take any medication.     He " has bilateral CTS and is followed by a hand surgeon. He tried wrist splints but this worsens his pain. He is taking gabapentin 300 mg nightly.      He has chronic low back pain after fall from a height in 2003. He underwent a L2-3 fusion that helped his radicular pain down his leg. He continues with left sided back pain but denies any radiation. It is not constant but will flare with vigorous activity. Better with rest.     He has a rash in his groin that is intermittent and itches. He has had this for over 2 months. No sores or ulcers.  It is very itchy and red.  He tried topical OTC treatment but has no response. He feels this rash comes after wearing a wetsuit for his job.     He lives with his wife and feels safe. He works as a .  He does not exercise. He does eat healthy.      Colonoscopy---none  Tdap---4/2024  Influenza vaccine---refused   Shingrex vaccine-----none   Covid vaccine---refused     Review of Systems   Constitutional:  Negative for appetite change, fatigue, fever and unexpected weight change.   HENT:  Negative for congestion, ear pain, hearing loss, sore throat and trouble swallowing.    Eyes:  Negative for pain and visual disturbance.   Respiratory:  Negative for cough, chest tightness, shortness of breath and wheezing.    Cardiovascular:  Negative for chest pain, palpitations and leg swelling.   Gastrointestinal:  Negative for abdominal pain, blood in stool, constipation, diarrhea, nausea and vomiting.   Endocrine: Negative for polyuria.   Genitourinary:  Negative for dysuria and hematuria.   Musculoskeletal:  Positive for back pain. Negative for arthralgias and myalgias.   Skin:  Negative for rash.   Neurological:  Positive for headaches. Negative for dizziness, weakness and numbness.   Hematological:  Does not bruise/bleed easily.   Psychiatric/Behavioral:  Negative for dysphoric mood, sleep disturbance and suicidal ideas. The patient is not nervous/anxious.       "  Objective:      Vitals:    09/05/24 0927   BP: 100/70   BP Location: Right arm   Patient Position: Sitting   BP Method: Large (Manual)   Pulse: 65   Resp: 18   Temp: 98.2 °F (36.8 °C)   SpO2: (!) 94%   Weight: 109 kg (240 lb 4.8 oz)   Height: 6' 1" (1.854 m)     Body mass index is 31.7 kg/m².  Physical Exam    General appearance: No acute distress, cooperative  Eyes: PERRL, EOMI, conjunctiva clear  Ears: normal external ear and pinna, tm clear without drainage, canals clear  Nose: Normal mucosa without drainage  Throat: no exudates or erythema, tonsils not enlarged  Mouth: no sores or lesions, moist mucous membranes  Neck: FROM, soft, supple, no thyromegaly, no bruits  Lymph: no anterior or posterior cervical adenopathy  Heart::  Regular rate and rhythm, no murmur  Lung: Clear to ascultation bilaterally, no wheezing, no rales, no rhonchi, no distress  Abdomen: Soft, nontender, no distention, no hepatosplenomegaly, bowel sounds normal, no guarding, no rebound, no peritoneal signs  Skin: no rashes, no lesions  Extremities: no edema, no cyanosis  Neuro: CN 2-12 intact, 5/5 muscle strength upper and lower extremity bilaterally, 2+ DTRs UE and LE bilaterally, normal gait  Peripheral pulses: 2+ pedal pulses bilaterally, good perfusion and color  Musculoskeletal: FROM, good strenth, no tenderness  Joint: normal appearance, no swelling, no warmth, no deformity in all joints    Assessment:       1. Well adult exam    2. Aneurysm of ascending aorta without rupture    3. Splenic artery aneurysm    4. Gastroesophageal reflux disease without esophagitis    5. Tinea cruris    6. Bilateral carpal tunnel syndrome    7. Chronic midline low back pain without sciatica    8. Need for shingles vaccine    9. Screen for colon cancer        Plan:       Well adult exam  He is due for labs today. Given shingles vaccine #1 and he refused flu vaccine. He is due for a colonoscopy.   -     CBC Auto Differential  -     Comprehensive Metabolic " Panel  -     Hemoglobin A1C  -     HIV 1/2 Ag/Ab (4th Gen)  -     Hepatitis C Antibody  -     Lipid Panel  -     TSH  -     PSA, Screening    Aneurysm of ascending aorta without rupture  Ectasia noted on CTA from 2021. Will recheck.   -     CTA Chest Abdomen Non Coronary; Future; Expected date: 09/05/2024    Splenic artery aneurysm  Noted on CTA on 4/2023. Will recheck for stability.   -     CTA Chest Abdomen Non Coronary; Future; Expected date: 09/05/2024    Gastroesophageal reflux disease without esophagitis  Well controlled and continue current regimen.     -     pantoprazole (PROTONIX) 20 MG tablet; Take 1 tablet (20 mg total) by mouth once daily.  Dispense: 90 tablet; Refill: 3    Tinea cruris  Will treat with topical terbinafine.   -     terbinafine HCL (ANTIFUNGAL, TERBINAFINE,) 1 % cream; Apply topically 2 (two) times daily.  Dispense: 30 g; Refill: 6    Bilateral carpal tunnel syndrome  Stable on gabapentin nightly. He is followed by a hand surgeon.   -     gabapentin (NEURONTIN) 300 MG capsule; Take 1 capsule (300 mg total) by mouth every evening.  Dispense: 90 capsule; Refill: 3    Chronic midline low back pain without sciatica  STable at baseline and only flares with intense activity.     Need for shingles vaccine  -     varicella zoster (Shingrix) IM vaccine (>/= 51 yo)    Screen for colon cancer  -     Case Request Endoscopy: COLONOSCOPY    Follow up in about 6 months (around 3/5/2025) for chronic medical issues and shingles vaccine .

## 2024-09-06 ENCOUNTER — PATIENT MESSAGE (OUTPATIENT)
Dept: FAMILY MEDICINE | Facility: CLINIC | Age: 49
End: 2024-09-06
Payer: MEDICAID

## 2024-09-09 RX ORDER — PANTOPRAZOLE SODIUM 20 MG/1
20 TABLET, DELAYED RELEASE ORAL DAILY
Qty: 90 TABLET | Refills: 3 | Status: SHIPPED | OUTPATIENT
Start: 2024-09-09 | End: 2025-09-04

## 2024-09-09 RX ORDER — GABAPENTIN 300 MG/1
300 CAPSULE ORAL NIGHTLY
Qty: 90 CAPSULE | Refills: 3 | Status: SHIPPED | OUTPATIENT
Start: 2024-09-09 | End: 2025-09-09

## 2024-09-10 ENCOUNTER — TELEPHONE (OUTPATIENT)
Dept: FAMILY MEDICINE | Facility: CLINIC | Age: 49
End: 2024-09-10
Payer: MEDICAID

## 2024-09-10 NOTE — TELEPHONE ENCOUNTER
----- Message from Luz Mcgowan sent at 9/9/2024  3:00 PM CDT -----  Contact: pt wife mindi  Type: Needs Medical Advice  Who Called:  pt wife mindi  Pharmacy name and phone #:    [id number] - [place] - [address] AT [facility] [address]  [address] [address]  Phone: [phone] Fax: [phone]     Best Call Back Number: [phone]   Additional Information: pt was seen 9/5 and medication was sent to an online pharmacy but it will 3 weeks so they would like gabapentin (NEURONTIN) 300 MG capsule  Medication  Date: 9/5/2024 Department: [facility] Ordering/Authorizing:   pantoprazole (PROTONIX) 20 MG tablet  Medication  Date: 9/5/2024 Department: [facility] Ordering/Authorizi    terbinafine HCL (ANTIFUNGAL, TERBINAFINE,) 1 % cream  Medication  Date: 9/5/2024 Department: [facility] Ordering/Authorizing: Betty Malhotra, DO       To be sent to the above pharmacy instead please call

## 2024-09-13 ENCOUNTER — HOSPITAL ENCOUNTER (OUTPATIENT)
Dept: RADIOLOGY | Facility: HOSPITAL | Age: 49
Discharge: HOME | End: 2024-09-13
Attending: INTERNAL MEDICINE
Payer: MEDICAID

## 2024-09-13 DIAGNOSIS — I71.21 ANEURYSM OF ASCENDING AORTA WITHOUT RUPTURE: ICD-10-CM

## 2024-09-13 DIAGNOSIS — I72.8 SPLENIC ARTERY ANEURYSM: ICD-10-CM

## 2024-09-13 PROCEDURE — 71275 CT ANGIOGRAPHY CHEST: CPT | Mod: 26,,, | Performed by: RADIOLOGY

## 2024-09-13 PROCEDURE — 71275 CT ANGIOGRAPHY CHEST: CPT | Mod: TC

## 2024-09-13 PROCEDURE — 74175 CTA ABDOMEN W/CONTRAST: CPT | Mod: 26,,, | Performed by: RADIOLOGY

## 2024-09-13 PROCEDURE — 25500020 PHARM REV CODE 255

## 2024-09-13 PROCEDURE — 74175 CTA ABDOMEN W/CONTRAST: CPT | Mod: TC

## 2024-09-13 RX ADMIN — IOHEXOL 100 ML: 350 INJECTION, SOLUTION INTRAVENOUS at 08:09

## 2024-09-16 ENCOUNTER — PATIENT MESSAGE (OUTPATIENT)
Dept: FAMILY MEDICINE | Facility: CLINIC | Age: 49
End: 2024-09-16
Payer: MEDICAID

## 2024-10-21 ENCOUNTER — PATIENT MESSAGE (OUTPATIENT)
Dept: GASTROENTEROLOGY | Facility: CLINIC | Age: 49
End: 2024-10-21
Payer: MEDICAID

## 2025-01-07 ENCOUNTER — CLINICAL SUPPORT (OUTPATIENT)
Dept: FAMILY MEDICINE | Facility: CLINIC | Age: 50
End: 2025-01-07
Payer: MEDICAID

## 2025-01-07 DIAGNOSIS — Z23 IMMUNIZATION DUE: Primary | ICD-10-CM

## 2025-01-07 PROCEDURE — 90471 IMMUNIZATION ADMIN: CPT | Mod: S$GLB,,, | Performed by: INTERNAL MEDICINE

## 2025-01-07 PROCEDURE — 90750 HZV VACC RECOMBINANT IM: CPT | Mod: S$GLB,,, | Performed by: INTERNAL MEDICINE

## 2025-02-18 ENCOUNTER — PATIENT MESSAGE (OUTPATIENT)
Dept: GASTROENTEROLOGY | Facility: CLINIC | Age: 50
End: 2025-02-18
Payer: MEDICAID

## 2025-03-31 ENCOUNTER — ANESTHESIA (OUTPATIENT)
Dept: ENDOSCOPY | Facility: HOSPITAL | Age: 50
End: 2025-03-31
Payer: MEDICAID

## 2025-03-31 ENCOUNTER — ANESTHESIA EVENT (OUTPATIENT)
Dept: ENDOSCOPY | Facility: HOSPITAL | Age: 50
End: 2025-03-31
Payer: MEDICAID

## 2025-03-31 ENCOUNTER — HOSPITAL ENCOUNTER (OUTPATIENT)
Facility: HOSPITAL | Age: 50
Discharge: HOME | End: 2025-03-31
Attending: INTERNAL MEDICINE | Admitting: INTERNAL MEDICINE
Payer: MEDICAID

## 2025-03-31 DIAGNOSIS — K64.8 INTERNAL HEMORRHOIDS: ICD-10-CM

## 2025-03-31 DIAGNOSIS — K57.90 DIVERTICULOSIS: ICD-10-CM

## 2025-03-31 DIAGNOSIS — Z12.11 SCREEN FOR COLON CANCER: ICD-10-CM

## 2025-03-31 DIAGNOSIS — K63.5 POLYP OF COLON, UNSPECIFIED PART OF COLON, UNSPECIFIED TYPE: Primary | ICD-10-CM

## 2025-03-31 PROCEDURE — 25000003 PHARM REV CODE 250: Performed by: INTERNAL MEDICINE

## 2025-03-31 PROCEDURE — 27201089 HC SNARE, DISP (ANY): Performed by: INTERNAL MEDICINE

## 2025-03-31 PROCEDURE — 37000009 HC ANESTHESIA EA ADD 15 MINS: Performed by: INTERNAL MEDICINE

## 2025-03-31 PROCEDURE — 45390 COLONOSCOPY W/RESECTION: CPT | Mod: 22,XS,, | Performed by: INTERNAL MEDICINE

## 2025-03-31 PROCEDURE — 45390 COLONOSCOPY W/RESECTION: CPT | Mod: XS | Performed by: INTERNAL MEDICINE

## 2025-03-31 PROCEDURE — 45380 COLONOSCOPY AND BIOPSY: CPT | Mod: XS | Performed by: INTERNAL MEDICINE

## 2025-03-31 PROCEDURE — 27201028 HC NEEDLE, SCLERO: Performed by: INTERNAL MEDICINE

## 2025-03-31 PROCEDURE — 45385 COLONOSCOPY W/LESION REMOVAL: CPT | Performed by: INTERNAL MEDICINE

## 2025-03-31 PROCEDURE — 27201012 HC FORCEPS, HOT/COLD, DISP: Performed by: INTERNAL MEDICINE

## 2025-03-31 PROCEDURE — 45380 COLONOSCOPY AND BIOPSY: CPT | Mod: XS,,, | Performed by: INTERNAL MEDICINE

## 2025-03-31 PROCEDURE — 45385 COLONOSCOPY W/LESION REMOVAL: CPT | Mod: 51,,, | Performed by: INTERNAL MEDICINE

## 2025-03-31 PROCEDURE — 37000008 HC ANESTHESIA 1ST 15 MINUTES: Performed by: INTERNAL MEDICINE

## 2025-03-31 PROCEDURE — 63600175 PHARM REV CODE 636 W HCPCS: Performed by: NURSE ANESTHETIST, CERTIFIED REGISTERED

## 2025-03-31 RX ORDER — LIDOCAINE HYDROCHLORIDE 20 MG/ML
INJECTION INTRAVENOUS
Status: DISCONTINUED | OUTPATIENT
Start: 2025-03-31 | End: 2025-03-31

## 2025-03-31 RX ORDER — SODIUM CHLORIDE 9 MG/ML
INJECTION, SOLUTION INTRAVENOUS CONTINUOUS
Status: DISCONTINUED | OUTPATIENT
Start: 2025-03-31 | End: 2025-03-31 | Stop reason: HOSPADM

## 2025-03-31 RX ORDER — PROPOFOL 10 MG/ML
VIAL (ML) INTRAVENOUS
Status: DISCONTINUED | OUTPATIENT
Start: 2025-03-31 | End: 2025-03-31

## 2025-03-31 RX ADMIN — PROPOFOL 50 MG: 10 INJECTION, EMULSION INTRAVENOUS at 08:03

## 2025-03-31 RX ADMIN — PROPOFOL 50 MG: 10 INJECTION, EMULSION INTRAVENOUS at 09:03

## 2025-03-31 RX ADMIN — PROPOFOL 100 MG: 10 INJECTION, EMULSION INTRAVENOUS at 08:03

## 2025-03-31 RX ADMIN — LIDOCAINE HYDROCHLORIDE 100 MG: 20 INJECTION, SOLUTION INTRAVENOUS at 08:03

## 2025-03-31 RX ADMIN — SODIUM CHLORIDE: 9 INJECTION, SOLUTION INTRAVENOUS at 07:03

## 2025-03-31 NOTE — PLAN OF CARE
Vss, chung po fluids, denies pain, ambulates easily. IV removed, catheter intact. Discharge instructions provided and states understanding. States ready to go home.  Discharged from facility with family per wheelchair.

## 2025-03-31 NOTE — TRANSFER OF CARE
Anesthesia Transfer of Care Note    Patient: Tommie Augustine    Procedure(s) Performed: Procedure(s) (LRB):  COLONOSCOPY, SCREENING, LOW RISK PATIENT (Arrive 0700) (N/A)    Patient location: GI    Anesthesia Type: general    Transport from OR: Transported from OR on room air with adequate spontaneous ventilation    Post pain: adequate analgesia    Post assessment: no apparent anesthetic complications and tolerated procedure well    Post vital signs: stable    Level of consciousness: awake, alert and oriented    Nausea/Vomiting: no nausea/vomiting    Complications: none    Transfer of care protocol was followed    Last vitals: Visit Vitals  /79 (BP Location: Left arm, Patient Position: Lying)   Pulse 71   Temp 37 °C (98.6 °F) (Skin)   Resp 16   SpO2 (!) 94%     
negative

## 2025-03-31 NOTE — [employer] PATIENT INSTRUCTIONS
Discharge Summary/Instructions after an Endoscopic Procedure  Patient Name: Tommie Augustine  Patient MRN: [id number]  Patient YOB: 1975 Monday, March 31, 2025  Santino Santos MD  Dear patient,  As a result of recent federal legislation (The Federal Cures Act), you may   receive lab or pathology results from your procedure in your [facility]   account before your physician is able to contact you. Your physician or   their representative will relay the results to you with their   recommendations at their soonest availability.  Thank you,  RESTRICTIONS:  During your procedure today, you received medications for sedation.  These   medications may affect your judgment, balance and coordination.  Therefore,   for 24 hours, you have the following restrictions:   - DO NOT drive a car, operate machinery, make legal/financial decisions,   sign important papers or drink alcohol.    ACTIVITY:  Today: no heavy lifting, straining or running due to procedural   sedation/anesthesia.  The following day: return to full activity including work.  DIET:  Eat and drink normally unless instructed otherwise.     TREATMENT FOR COMMON SIDE EFFECTS:  - Mild abdominal pain, nausea, belching, bloating or excessive gas:  rest,   eat lightly and use a heating pad.  - Sore Throat: treat with throat lozenges and/or gargle with warm salt   water.  - Because air was used during the procedure, expelling large amounts of air   from your rectum or belching is normal.  - If a bowel prep was taken, you may not have a bowel movement for 1-3 days.    This is normal.  SYMPTOMS TO WATCH FOR AND REPORT TO YOUR PHYSICIAN:  1. Abdominal pain or bloating, other than gas cramps.  2. Chest pain.  3. Back pain.  4. Signs of infection such as: chills or fever occurring within 24 hours   after the procedure.  5. Rectal bleeding, which would show as bright red, maroon, or black stools.   (A tablespoon of blood from the rectum is not serious, especially if    hemorrhoids are present.)  6. Vomiting.  7. Weakness or dizziness.  GO DIRECTLY TO THE NEAREST EMERGENCY ROOM IF YOU HAVE ANY OF THE FOLLOWING:      Difficulty breathing              Chills and/or fever over 101 F   Persistent vomiting and/or vomiting blood   Severe abdominal pain   Severe chest pain   Black, tarry stools   Bleeding- more than one tablespoon   Any other symptom or condition that you feel may need urgent attention  Your doctor recommends these additional instructions:  If any biopsies were taken, your doctors clinic will contact you in 1 to 2   weeks with any results.  - Patient has a contact number available for emergencies.  The signs and   symptoms of potential delayed complications were discussed with the   patient.  Return to normal activities tomorrow.  Written discharge   instructions were provided to the patient.   - High fiber diet.   - Continue present medications.   - Await pathology results.   - No aspirin, ibuprofen, naproxen, or other non-steroidal anti-inflammatory   drugs for 2 weeks after polyp removal.   - Repeat colonoscopy in 3 years for surveillance.   - Discharge patient to home (ambulatory).   - Return to GI office PRN.  For questions, problems or results please call your physician - Santino Santos MD at Work:  [phone].  [facility], EMERGENCY ROOM PHONE NUMBER: [phone]  IF A COMPLICATION OR EMERGENCY SITUATION ARISES AND YOU ARE UNABLE TO REACH   YOUR PHYSICIAN - GO DIRECTLY TO THE EMERGENCY ROOM.  Santino Santos MD  3/31/2025 9:19:48 AM  This report has been verified and signed electronically.  Dear patient,  As a result of recent federal legislation (The Federal Cures Act), you may   receive lab or pathology results from your procedure in your [facility]   account before your physician is able to contact you. Your physician or   their representative will relay the results to you with their   recommendations at their soonest availability.  Thank  you,  [employer]ATION

## 2025-03-31 NOTE — ANESTHESIA POSTPROCEDURE EVALUATION
Anesthesia Post Evaluation    Patient: Tommie Augustine    Procedure(s) Performed: Procedure(s) (LRB):  COLONOSCOPY, SCREENING, LOW RISK PATIENT (Arrive 0700) (N/A)    Final Anesthesia Type: general      Patient location during evaluation: PACU  Patient participation: Yes- Able to Participate  Level of consciousness: awake and alert  Post-procedure vital signs: reviewed and stable  Pain management: adequate  Airway patency: patent    PONV status at discharge: No PONV  Anesthetic complications: no      Cardiovascular status: blood pressure returned to baseline  Respiratory status: unassisted  Hydration status: euvolemic  Follow-up not needed.              Vitals Value Taken Time   /81 03/31/25 09:40   Temp 37 °C (98.6 °F) 03/31/25 09:40   Pulse 57 03/31/25 09:40   Resp 14 03/31/25 09:40   SpO2 98 % 03/31/25 09:40         Event Time   Out of Recovery 09:48:16         Pain/Juliane Score: Juliane Score: 10 (3/31/2025  9:45 AM)

## 2025-03-31 NOTE — H&P
CC: Screening for colorectal cancer - first occurrence    49 year old male with above. States that symptoms are absent, no alleviating/exacerbating factors. No family history of colorectal CA. No personal history of polyps. No bleeding or weight loss.     ROS:  No headache, no fever/chills, no chest pain/SOB, no nausea/vomiting/diarrhea/constipation/GI bleeding/abdominal pain, no dysuria/hematuria.    VSSAF   Exam:   Alert and oriented x 3; no apparent distress   PERRLA, sclera anicteric  CV: Regular rate/rhythm, normal PMI   Lungs: Clear bilaterally with no wheeze/rales   Abdomen: Soft, NT/ND, normal bowel sounds   Ext: No cyanosis, clubbing     Impression:   As above    Plan:   Proceed with endoscopy. Further recs to follow.

## 2025-03-31 NOTE — ANESTHESIA PREPROCEDURE EVALUATION
03/31/2025  Tommie Augustine is a 49 y.o., male.      Pre-op Assessment    I have reviewed the Patient Summary Reports.     I have reviewed the Nursing Notes. I have reviewed the NPO Status.   I have reviewed the Medications.     Review of Systems  Anesthesia Hx:             Denies Family Hx of Anesthesia complications.    Denies Personal Hx of Anesthesia complications.                    Hepatic/GI:  Bowel Prep.   GERD                Neurological:        Peripheral Neuropathy                              Physical Exam  General: Well nourished, Cooperative, Alert and Oriented    Airway:  Mallampati: III         Anesthesia Plan  Type of Anesthesia, risks & benefits discussed:    Anesthesia Type: Gen Natural Airway  Intra-op Monitoring Plan: Standard ASA Monitors  Induction:  IV  Informed Consent: Informed consent signed with the Patient and all parties understand the risks and agree with anesthesia plan.  All questions answered.   ASA Score: 2    Ready For Surgery From Anesthesia Perspective.     .

## 2025-04-01 ENCOUNTER — RESULTS FOLLOW-UP (OUTPATIENT)
Dept: GASTROENTEROLOGY | Facility: CLINIC | Age: 50
End: 2025-04-01

## 2025-04-01 VITALS
SYSTOLIC BLOOD PRESSURE: 114 MMHG | HEART RATE: 57 BPM | OXYGEN SATURATION: 98 % | TEMPERATURE: 99 F | RESPIRATION RATE: 14 BRPM | DIASTOLIC BLOOD PRESSURE: 81 MMHG